# Patient Record
Sex: FEMALE | Race: WHITE | NOT HISPANIC OR LATINO | Employment: OTHER | URBAN - METROPOLITAN AREA
[De-identification: names, ages, dates, MRNs, and addresses within clinical notes are randomized per-mention and may not be internally consistent; named-entity substitution may affect disease eponyms.]

---

## 2023-03-21 ENCOUNTER — TELEPHONE (OUTPATIENT)
Dept: PRIMARY CARE | Facility: CLINIC | Age: 73
End: 2023-03-21

## 2023-03-21 DIAGNOSIS — E78.5 HYPERLIPIDEMIA, UNSPECIFIED HYPERLIPIDEMIA TYPE: Primary | ICD-10-CM

## 2023-03-23 ENCOUNTER — LAB (OUTPATIENT)
Dept: LAB | Facility: LAB | Age: 73
End: 2023-03-23
Payer: MEDICARE

## 2023-03-23 DIAGNOSIS — E78.5 HYPERLIPIDEMIA, UNSPECIFIED HYPERLIPIDEMIA TYPE: ICD-10-CM

## 2023-03-23 LAB
ALANINE AMINOTRANSFERASE (SGPT) (U/L) IN SER/PLAS: 28 U/L (ref 7–45)
ALBUMIN (G/DL) IN SER/PLAS: 4.4 G/DL (ref 3.4–5)
ALKALINE PHOSPHATASE (U/L) IN SER/PLAS: 98 U/L (ref 33–136)
ANION GAP IN SER/PLAS: 14 MMOL/L (ref 10–20)
ASPARTATE AMINOTRANSFERASE (SGOT) (U/L) IN SER/PLAS: 23 U/L (ref 9–39)
BILIRUBIN TOTAL (MG/DL) IN SER/PLAS: 0.6 MG/DL (ref 0–1.2)
CALCIUM (MG/DL) IN SER/PLAS: 10.4 MG/DL (ref 8.6–10.3)
CARBON DIOXIDE, TOTAL (MMOL/L) IN SER/PLAS: 26 MMOL/L (ref 21–32)
CHLORIDE (MMOL/L) IN SER/PLAS: 104 MMOL/L (ref 98–107)
CHOLESTEROL (MG/DL) IN SER/PLAS: 162 MG/DL (ref 0–199)
CHOLESTEROL IN HDL (MG/DL) IN SER/PLAS: 58.5 MG/DL
CHOLESTEROL/HDL RATIO: 2.8
CREATININE (MG/DL) IN SER/PLAS: 0.84 MG/DL (ref 0.5–1.05)
GFR FEMALE: 74 ML/MIN/1.73M2
GLUCOSE (MG/DL) IN SER/PLAS: 87 MG/DL (ref 74–99)
LDL: 89 MG/DL (ref 0–99)
POTASSIUM (MMOL/L) IN SER/PLAS: 4.4 MMOL/L (ref 3.5–5.3)
PROTEIN TOTAL: 6.9 G/DL (ref 6.4–8.2)
SODIUM (MMOL/L) IN SER/PLAS: 140 MMOL/L (ref 136–145)
TRIGLYCERIDE (MG/DL) IN SER/PLAS: 72 MG/DL (ref 0–149)
UREA NITROGEN (MG/DL) IN SER/PLAS: 19 MG/DL (ref 6–23)
VLDL: 14 MG/DL (ref 0–40)

## 2023-03-23 PROCEDURE — 80061 LIPID PANEL: CPT

## 2023-03-23 PROCEDURE — 80053 COMPREHEN METABOLIC PANEL: CPT

## 2023-03-23 PROCEDURE — 36415 COLL VENOUS BLD VENIPUNCTURE: CPT

## 2023-03-29 NOTE — RESULT ENCOUNTER NOTE
The issue is that she can't get the name brand the pharmacies only have the generic is she in the safe zone being off the medication until everything gets worked out

## 2023-04-26 DIAGNOSIS — N39.41 URGE INCONTINENCE: Primary | ICD-10-CM

## 2023-04-28 RX ORDER — MIRABEGRON 50 MG/1
TABLET, FILM COATED, EXTENDED RELEASE ORAL
Qty: 90 TABLET | Refills: 0 | Status: SHIPPED | OUTPATIENT
Start: 2023-04-28 | End: 2023-06-23 | Stop reason: SDUPTHER

## 2023-06-23 ENCOUNTER — OFFICE VISIT (OUTPATIENT)
Dept: PRIMARY CARE | Facility: CLINIC | Age: 73
End: 2023-06-23
Payer: MEDICARE

## 2023-06-23 DIAGNOSIS — Z78.0 MENOPAUSE: ICD-10-CM

## 2023-06-23 DIAGNOSIS — E88.810 DYSMETABOLIC SYNDROME: ICD-10-CM

## 2023-06-23 DIAGNOSIS — R03.0 ELEVATED BP WITHOUT DIAGNOSIS OF HYPERTENSION: ICD-10-CM

## 2023-06-23 DIAGNOSIS — R92.30 DENSE BREAST TISSUE ON MAMMOGRAM: ICD-10-CM

## 2023-06-23 DIAGNOSIS — Z12.31 ENCOUNTER FOR SCREENING MAMMOGRAM FOR MALIGNANT NEOPLASM OF BREAST: ICD-10-CM

## 2023-06-23 DIAGNOSIS — E78.5 HYPERLIPIDEMIA, UNSPECIFIED HYPERLIPIDEMIA TYPE: ICD-10-CM

## 2023-06-23 DIAGNOSIS — R05.3 CHRONIC COUGH: ICD-10-CM

## 2023-06-23 DIAGNOSIS — Z00.00 ENCOUNTER FOR ANNUAL WELLNESS EXAM IN MEDICARE PATIENT: Primary | ICD-10-CM

## 2023-06-23 PROBLEM — M17.9 OSTEOARTHRITIS OF KNEE: Status: ACTIVE | Noted: 2023-06-23

## 2023-06-23 PROBLEM — N39.41 URGE INCONTINENCE: Status: ACTIVE | Noted: 2023-06-23

## 2023-06-23 PROBLEM — E55.9 VITAMIN D DEFICIENCY: Status: ACTIVE | Noted: 2023-06-23

## 2023-06-23 PROBLEM — R55 SYNCOPE: Status: ACTIVE | Noted: 2023-06-23

## 2023-06-23 PROBLEM — B02.29 POST HERPETIC NEURALGIA: Status: ACTIVE | Noted: 2023-06-23

## 2023-06-23 PROCEDURE — 1159F MED LIST DOCD IN RCRD: CPT | Performed by: SPECIALIST

## 2023-06-23 PROCEDURE — 1036F TOBACCO NON-USER: CPT | Performed by: SPECIALIST

## 2023-06-23 PROCEDURE — 1160F RVW MEDS BY RX/DR IN RCRD: CPT | Performed by: SPECIALIST

## 2023-06-23 PROCEDURE — G0439 PPPS, SUBSEQ VISIT: HCPCS | Performed by: SPECIALIST

## 2023-06-23 PROCEDURE — 1170F FXNL STATUS ASSESSED: CPT | Performed by: SPECIALIST

## 2023-06-23 PROCEDURE — 3008F BODY MASS INDEX DOCD: CPT | Performed by: SPECIALIST

## 2023-06-23 RX ORDER — FLUTICASONE PROPIONATE 50 MCG
2 SPRAY, SUSPENSION (ML) NASAL DAILY
COMMUNITY
Start: 2019-10-03

## 2023-06-23 RX ORDER — POTASSIUM CHLORIDE 750 MG/1
10 TABLET, EXTENDED RELEASE ORAL 2 TIMES DAILY
COMMUNITY
End: 2024-02-20 | Stop reason: SDUPTHER

## 2023-06-23 RX ORDER — METHYLPREDNISOLONE 4 MG
1 TABLET, DOSE PACK ORAL DAILY
COMMUNITY

## 2023-06-23 RX ORDER — ACETAMINOPHEN 500 MG
50 TABLET ORAL DAILY
COMMUNITY
Start: 2018-09-11

## 2023-06-23 RX ORDER — PROGESTERONE 100 MG/1
100 CAPSULE ORAL DAILY
COMMUNITY

## 2023-06-23 RX ORDER — MAGNESIUM 200 MG
1 TABLET ORAL DAILY
COMMUNITY
Start: 2022-10-09

## 2023-06-23 RX ORDER — FAMOTIDINE, CALCIUM CARBONATE, AND MAGNESIUM HYDROXIDE 10; 800; 165 MG/1; MG/1; MG/1
1 TABLET, CHEWABLE ORAL NIGHTLY
COMMUNITY

## 2023-06-23 RX ORDER — VERAPAMIL HYDROCHLORIDE 180 MG/1
1 TABLET, FILM COATED, EXTENDED RELEASE ORAL 2 TIMES DAILY
COMMUNITY
Start: 2006-09-21 | End: 2023-12-29

## 2023-06-23 RX ORDER — MINERAL OIL
1 ENEMA (ML) RECTAL DAILY
COMMUNITY

## 2023-06-23 RX ORDER — ROSUVASTATIN CALCIUM 5 MG/1
5 TABLET, COATED ORAL
COMMUNITY
Start: 2019-10-03

## 2023-06-23 RX ORDER — DOCUSATE SODIUM 100 MG/1
100 CAPSULE, LIQUID FILLED ORAL DAILY
COMMUNITY

## 2023-06-23 RX ORDER — MIRABEGRON 50 MG/1
1 TABLET, EXTENDED RELEASE ORAL DAILY
COMMUNITY
Start: 2018-09-20 | End: 2023-07-29

## 2023-06-23 ASSESSMENT — ACTIVITIES OF DAILY LIVING (ADL)
BATHING: INDEPENDENT
DRESSING: INDEPENDENT
TAKING_MEDICATION: INDEPENDENT
DOING_HOUSEWORK: INDEPENDENT
GROCERY_SHOPPING: INDEPENDENT
MANAGING_FINANCES: INDEPENDENT

## 2023-06-23 ASSESSMENT — PATIENT HEALTH QUESTIONNAIRE - PHQ9
SUM OF ALL RESPONSES TO PHQ9 QUESTIONS 1 AND 2: 0
2. FEELING DOWN, DEPRESSED OR HOPELESS: NOT AT ALL
1. LITTLE INTEREST OR PLEASURE IN DOING THINGS: NOT AT ALL

## 2023-06-23 NOTE — ASSESSMENT & PLAN NOTE
Dense breast tissue on mammogram, discussed self pay FAST MRI  Will let me know if she wants me to order

## 2023-06-23 NOTE — ASSESSMENT & PLAN NOTE
Labs ordered HBA1C but cancelled since it was not covered  Actively working toward weight loss with Endocrine

## 2023-06-23 NOTE — ASSESSMENT & PLAN NOTE
Brand Crestor not available, was paying out of pocket  Cannot tolerate generics  Ordered fx lipids   Last LDL 89 (3/2023) had been 3 weeks off Crestor

## 2023-06-23 NOTE — ASSESSMENT & PLAN NOTE
-Does not get annual influenza vaccine due to prior allergic reactions  -Declined Covid vaccines  -Prior PCV 2005  -Prior PPSV 23 on 9/2005, declines repeat   -Prior Tdap 7/2016  -Declined Shingrix vaccine  -Water jogging 5 days per week at the [employer]  -Actively working toward weight loss  -DXA normal 4/28/2021, ordered  -Mammogram 6/1/2022, dense breast tissue, discussed self-pay FAST MRI, ordered  -Colonoscopy 8/2019, repeat 5 years

## 2023-06-23 NOTE — PROGRESS NOTES
Subjective   Patient ID: Meghan Rinaldi is a 72 y.o. female who presents for Medicare Annual Wellness Visit Subsequent.    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, diverticulosis, and Covid here today for Annual Wellness Exam    Had migraine x 7 weeks after surgery procedure D&C  Sees Neuro, is on verapamil, for migraine basilar syndrome - had syncopal episode so now drinking gatorade since happened after exercise    Calcium 10.4 on CMP 3/23/2023, will recheck    Eyes have improved, attributes to Balance of Nature supplement    Cardiomegaly since Influenza B, had to retire    Lost 50 pounds but gained 5 back on MPSF diet with endo    Lost 6 friends , has been very hard    Cough when exercising since she had Covid and said  has same cough like a post nasal drip cough    Allergies   Allergen Reactions    Bee Venom Protein (Honey Bee) Anaphylaxis    Amoxicillin Other    Diclofenac Sodium Other     shingles    Doxycycline Unknown and Other    Erythromycin Other     Respitory    Gabapentin Other    Other Other     Mercury (preservative) causes severe diarrhea USED in all GENERIC MEDS. TO HAVE NO GENERIC MEDS    Preservative Other     Contact solution, respiratory, diarrhea    Rosuvastatin Other    Sulfa (Sulfonamide Antibiotics) Other    Thimerosal Other    Topiramate Other     States she becomes manic.    Cephalexin Rash and Other      Current Outpatient Medications   Medication Instructions    BIOFREEZE, MENTHOL, TOP 1 Application, Topical, 2 times daily PRN    biotin/folic ac/vit Bcomp,C/Zn (BIOTIN FORTE ORAL) 1 tablet, oral, Daily, Takes one 5 mg tab daily    calcium carb/vitamin D3/vit K1 (VIACTIV ORAL) 1 tablet, oral, 2 times daily    calcium polycarbophil (FIBER-CAPS, CA POLYCARBOPHIL, ORAL) 1 tablet, oral, Daily    cholecalciferol (VITAMIN D-3) 50 mcg, oral, Daily    docusate sodium  (COLACE) 100 mg, oral, Daily    famotidine-Ca carb-mag hydrox (Pepcid Complete) -165 mg chewable tablet 1 tablet, oral, Nightly    fexofenadine (Allegra Allergy) 180 mg tablet 1 tablet, oral, Daily    fluticasone (Flonase Allergy Relief) 50 mcg/actuation nasal spray 2 sprays, nasal, Daily    glucosamine sulfate (Glucosamine) 500 mg tablet 1 tablet, oral, Daily, Takes 5 Total Restores daily    magnesium 200 mg tablet 1 tablet, oral, Daily, Takes 250 mg daily    mirabegron (Myrbetriq) 50 mg tablet extended release 24 hr 24 hr tablet 1 tablet, oral, Daily    multivitamin capsule 1 capsule, oral, 2 times daily, Takes BALANCE OF NATURE    potassium chloride CR 10 mEq ER tablet 10 mEq, oral, 2 times daily    progesterone (PROMETRIUM) 100 mg, oral, Daily, Takes unknown dose 2 weeks every other month (Sees gyne)    rosuvastatin (CRESTOR) 5 mg, oral, Weekly    verapamil SR (Calan-SR) 180 mg ER tablet 1 tablet, oral, 2 times daily        Review of Systems  Constitutional  No fatigue, no fevers, no chills, no unintentional weight loss,   HEENT:  Positive Migraines behind both eyes 3 days stays in dark, No headaches, no dizziness, no double vision, no blurred vision, no hearing loss  Cardiovascular:  No chest pain, no palpitations, no shortness of breath with exertion (one flight of stairs)  Respiratory:  Positive cough,(since Covid), no hemoptysis, no wheezing, No shortness of breath at rest  GI:  No dysphagia, no odynophagia, no reflux, no abdominal pain, no nausea, no vomiting, no changes in bowel habits, moves bowels regularly since Covid, no bright red blood per rectum, no melena  :  No urinary frequency, no dysuria, positive urine incontinence  MSK:  No falls, knee joint pain, no joint swelling  Neuro:  No tremors, no extremity weakness, no changes in sensation  Breasts:  No abnormalities on self breast exam no nipple discharge no skin changes      Physical Exam  /70   Pulse 65   Resp 17   Ht 1.575 m (5'  "2\")   Wt 101 kg (223 lb 6.4 oz)   SpO2 97%   BMI 40.86 kg/m²   General:    Well-appearing  F in no acute distress, well nourished, well hydrated  Head:  Normocephalic, atraumatic  Skin:          Warm dry,   Eyes:  Anicteric sclera, pupils equal,   Ears:        TMs intact  Oral:      Not examined due to pandemic  Neck:   Supple, no cervical/supraclavicular adenopathy, no thyromegaly or nodules appreciated on exam  Cor:      Regular rate, normal S1, S2, no murmurs appreciated, no S3, no S4   Lungs:   Clear to auscultation b/l, no wheezes, no rhonchi, no crackles, no accessory respiratory muscle use  Abd:          Soft, nontender, no guarding, no rebound, no hepatosplenomegaly appreciated   Ext:            No lower extremity edema, no palpable cords  Pulses:      Pedal pulses intact  Neuro:   CN2-12 grossly intact     Assessment/Plan   Problem List Items Addressed This Visit       Encounter for annual wellness exam in Medicare patient - Primary     -Does not get annual influenza vaccine due to prior allergic reactions  -Declined Covid vaccines  -Prior PCV 2005  -Prior PPSV 23 on 9/2005, declines repeat   -Prior Tdap 7/2016  -Declined Shingrix vaccine  [facility]  -Actively working toward weight loss  -DXA normal 4/28/2021, ordered  -Mammogram 6/1/2022, dense breast tissue, discussed self-pay FAST MRI, ordered  -Colonoscopy 8/2019, repeat 5 years           Relevant Orders    Lipid Panel    CBC    Comprehensive Metabolic Panel    Encounter for screening mammogram for malignant neoplasm of breast     Mammogram 6/1/2022, repeat 1 year  Dense breast tissue noted on mammogram         Relevant Orders    BI mammo bilateral screening tomosynthesis    Dense breast tissue on mammogram     Dense breast tissue on mammogram, discussed self pay FAST MRI  Will let me know if she wants me to order         Menopause     Bone density test ordered (last normal 4/28/2021)           Relevant Orders    XR DEXA " bone density    Chronic cough    Relevant Orders    XR chest 2 views    CBC    Dysmetabolic syndrome     Labs ordered HBA1C but cancelled since it was not covered  Actively working toward weight loss with Endocrine           Hyperlipidemia     Brand Crestor not available, was paying out of pocket  Cannot tolerate generics  Ordered fx lipids   Last LDL 89 (3/2023) had been 3 weeks off Crestor          Relevant Orders    Lipid Panel    Comprehensive Metabolic Panel    Body mass index (BMI) of 40.0 to 44.9 in adult (CMS/[facility])     Successful weight loss on MPSF diet with endocrine  Weight 12/2022 was 230#, down to 223#         Elevated BP without diagnosis of hypertension     Blood pressure above goal today at 140/70, but has been ingesting sodium to help prevent syncope so reduce a bit (drinks Gatorade before and after exercise to help prevent syncope).  Plans home blood pressure monitoring and will help me know if BP consistently above 140/90.  On Verapamil for migraine basilar syndrome               Maki Michel, DO

## 2023-06-29 VITALS
BODY MASS INDEX: 41.11 KG/M2 | DIASTOLIC BLOOD PRESSURE: 70 MMHG | SYSTOLIC BLOOD PRESSURE: 140 MMHG | OXYGEN SATURATION: 97 % | WEIGHT: 223.4 LBS | RESPIRATION RATE: 17 BRPM | HEIGHT: 62 IN | HEART RATE: 65 BPM

## 2023-06-29 PROBLEM — R03.0 ELEVATED BP WITHOUT DIAGNOSIS OF HYPERTENSION: Status: ACTIVE | Noted: 2023-06-29

## 2023-06-29 NOTE — ASSESSMENT & PLAN NOTE
Blood pressure above goal today at 140/70, but has been ingesting sodium to help prevent syncope so reduce a bit (drinks Gatorade before and after exercise to help prevent syncope).  Plans home blood pressure monitoring and will help me know if BP consistently above 140/90.  On Verapamil for migraine basilar syndrome

## 2023-07-03 ENCOUNTER — LAB (OUTPATIENT)
Dept: LAB | Facility: LAB | Age: 73
End: 2023-07-03
Payer: MEDICARE

## 2023-07-03 DIAGNOSIS — Z00.00 ENCOUNTER FOR ANNUAL WELLNESS EXAM IN MEDICARE PATIENT: ICD-10-CM

## 2023-07-03 DIAGNOSIS — R05.3 CHRONIC COUGH: ICD-10-CM

## 2023-07-03 DIAGNOSIS — E78.5 HYPERLIPIDEMIA, UNSPECIFIED HYPERLIPIDEMIA TYPE: ICD-10-CM

## 2023-07-03 LAB
ERYTHROCYTE DISTRIBUTION WIDTH (RATIO) BY AUTOMATED COUNT: 12.8 % (ref 11.5–14.5)
ERYTHROCYTE MEAN CORPUSCULAR HEMOGLOBIN CONCENTRATION (G/DL) BY AUTOMATED: 31.7 G/DL (ref 32–36)
ERYTHROCYTE MEAN CORPUSCULAR VOLUME (FL) BY AUTOMATED COUNT: 101 FL (ref 80–100)
ERYTHROCYTES (10*6/UL) IN BLOOD BY AUTOMATED COUNT: 4.46 X10E12/L (ref 4–5.2)
HEMATOCRIT (%) IN BLOOD BY AUTOMATED COUNT: 45.1 % (ref 36–46)
HEMOGLOBIN (G/DL) IN BLOOD: 14.3 G/DL (ref 12–16)
LEUKOCYTES (10*3/UL) IN BLOOD BY AUTOMATED COUNT: 8.4 X10E9/L (ref 4.4–11.3)
NRBC (PER 100 WBCS) BY AUTOMATED COUNT: 0 /100 WBC (ref 0–0)
PLATELETS (10*3/UL) IN BLOOD AUTOMATED COUNT: 243 X10E9/L (ref 150–450)

## 2023-07-03 PROCEDURE — 85027 COMPLETE CBC AUTOMATED: CPT

## 2023-07-03 PROCEDURE — 80061 LIPID PANEL: CPT

## 2023-07-03 PROCEDURE — 80053 COMPREHEN METABOLIC PANEL: CPT

## 2023-07-03 PROCEDURE — 36415 COLL VENOUS BLD VENIPUNCTURE: CPT

## 2023-07-04 LAB
ALANINE AMINOTRANSFERASE (SGPT) (U/L) IN SER/PLAS: 20 U/L (ref 7–45)
ALBUMIN (G/DL) IN SER/PLAS: 4.2 G/DL (ref 3.4–5)
ALKALINE PHOSPHATASE (U/L) IN SER/PLAS: 97 U/L (ref 33–136)
ANION GAP IN SER/PLAS: 12 MMOL/L (ref 10–20)
ASPARTATE AMINOTRANSFERASE (SGOT) (U/L) IN SER/PLAS: 19 U/L (ref 9–39)
BILIRUBIN TOTAL (MG/DL) IN SER/PLAS: 0.5 MG/DL (ref 0–1.2)
CALCIUM (MG/DL) IN SER/PLAS: 10.1 MG/DL (ref 8.6–10.6)
CARBON DIOXIDE, TOTAL (MMOL/L) IN SER/PLAS: 29 MMOL/L (ref 21–32)
CHLORIDE (MMOL/L) IN SER/PLAS: 107 MMOL/L (ref 98–107)
CHOLESTEROL (MG/DL) IN SER/PLAS: 159 MG/DL (ref 0–199)
CHOLESTEROL IN HDL (MG/DL) IN SER/PLAS: 56.3 MG/DL
CHOLESTEROL/HDL RATIO: 2.8
CREATININE (MG/DL) IN SER/PLAS: 0.8 MG/DL (ref 0.5–1.05)
GFR FEMALE: 78 ML/MIN/1.73M2
GLUCOSE (MG/DL) IN SER/PLAS: 88 MG/DL (ref 74–99)
LDL: 89 MG/DL (ref 0–99)
POTASSIUM (MMOL/L) IN SER/PLAS: 4.6 MMOL/L (ref 3.5–5.3)
PROTEIN TOTAL: 6.6 G/DL (ref 6.4–8.2)
SODIUM (MMOL/L) IN SER/PLAS: 143 MMOL/L (ref 136–145)
TRIGLYCERIDE (MG/DL) IN SER/PLAS: 67 MG/DL (ref 0–149)
UREA NITROGEN (MG/DL) IN SER/PLAS: 18 MG/DL (ref 6–23)
VLDL: 13 MG/DL (ref 0–40)

## 2023-07-28 DIAGNOSIS — N39.41 URGE INCONTINENCE: Primary | ICD-10-CM

## 2023-07-29 RX ORDER — MIRABEGRON 50 MG/1
50 TABLET, FILM COATED, EXTENDED RELEASE ORAL DAILY
Qty: 90 TABLET | Refills: 3 | Status: SHIPPED | OUTPATIENT
Start: 2023-07-29

## 2023-08-22 PROBLEM — D22.9 MULTIPLE NEVI: Status: ACTIVE | Noted: 2023-08-22

## 2023-08-22 PROBLEM — M25.569 PAIN IN UNSPECIFIED KNEE: Status: ACTIVE | Noted: 2023-08-22

## 2023-08-22 PROBLEM — E66.01 MORBID (SEVERE) OBESITY DUE TO EXCESS CALORIES (MULTI): Status: ACTIVE | Noted: 2023-08-22

## 2023-08-22 PROBLEM — R82.90 ABNORMAL URINALYSIS: Status: ACTIVE | Noted: 2023-08-22

## 2023-08-22 PROBLEM — L60.3 DYSTROPHIC NAIL: Status: ACTIVE | Noted: 2023-08-22

## 2023-08-22 PROBLEM — Z86.010 HISTORY OF COLON POLYPS: Status: ACTIVE | Noted: 2023-08-22

## 2023-08-22 PROBLEM — M54.59 MECHANICAL LOW BACK PAIN: Status: ACTIVE | Noted: 2023-08-22

## 2023-08-22 PROBLEM — Z86.0100 HISTORY OF COLON POLYPS: Status: ACTIVE | Noted: 2023-08-22

## 2023-08-22 PROBLEM — R60.9 EDEMA: Status: ACTIVE | Noted: 2023-08-22

## 2023-08-22 RX ORDER — ACETAMINOPHEN 325 MG/1
1 TABLET ORAL EVERY 6 HOURS PRN
COMMUNITY
Start: 2021-04-05

## 2023-08-22 RX ORDER — MIRABEGRON 25 MG/1
25 TABLET, FILM COATED, EXTENDED RELEASE ORAL
COMMUNITY
Start: 2017-12-07

## 2023-08-22 RX ORDER — DICLOFENAC SODIUM 16.05 MG/ML
2 SOLUTION TOPICAL 2 TIMES DAILY
COMMUNITY
Start: 2017-12-05

## 2023-08-22 RX ORDER — BIOTIN 1 MG
1 TABLET ORAL
COMMUNITY

## 2023-08-22 RX ORDER — CELECOXIB 200 MG/1
200 CAPSULE ORAL 2 TIMES DAILY
COMMUNITY

## 2023-08-22 RX ORDER — MEDROXYPROGESTERONE ACETATE 10 MG/1
1 TABLET ORAL DAILY
COMMUNITY
Start: 2022-04-21

## 2023-08-22 RX ORDER — ELECTROLYTES/DEXTROSE
5 SOLUTION, ORAL ORAL EVERY MORNING
COMMUNITY
Start: 2018-09-11

## 2023-08-22 RX ORDER — TRAZODONE HYDROCHLORIDE 50 MG/1
50 TABLET ORAL NIGHTLY
COMMUNITY

## 2023-10-10 ENCOUNTER — APPOINTMENT (OUTPATIENT)
Dept: CARE COORDINATION | Facility: CLINIC | Age: 73
End: 2023-10-10
Payer: MEDICARE

## 2023-10-23 ENCOUNTER — TELEPHONE (OUTPATIENT)
Dept: NUTRITION | Facility: CLINIC | Age: 73
End: 2023-10-23
Payer: MEDICARE

## 2023-10-23 NOTE — PROGRESS NOTES
Called patient to discuss next requested MNT appointment. Unable to leave message due to a full voicemail box. Will make a second attempt at another time.

## 2023-11-14 ENCOUNTER — OFFICE VISIT (OUTPATIENT)
Dept: ENDOCRINOLOGY | Facility: CLINIC | Age: 73
End: 2023-11-14
Payer: MEDICARE

## 2023-11-14 VITALS — WEIGHT: 226 LBS | DIASTOLIC BLOOD PRESSURE: 78 MMHG | SYSTOLIC BLOOD PRESSURE: 124 MMHG | BODY MASS INDEX: 41.34 KG/M2

## 2023-11-14 DIAGNOSIS — E88.810 DYSMETABOLIC SYNDROME: Primary | ICD-10-CM

## 2023-11-14 DIAGNOSIS — M17.9 OSTEOARTHRITIS OF KNEE, UNSPECIFIED LATERALITY, UNSPECIFIED OSTEOARTHRITIS TYPE: ICD-10-CM

## 2023-11-14 DIAGNOSIS — E78.5 HYPERLIPIDEMIA, UNSPECIFIED HYPERLIPIDEMIA TYPE: ICD-10-CM

## 2023-11-14 PROCEDURE — 1160F RVW MEDS BY RX/DR IN RCRD: CPT | Performed by: INTERNAL MEDICINE

## 2023-11-14 PROCEDURE — 99214 OFFICE O/P EST MOD 30 MIN: CPT | Performed by: INTERNAL MEDICINE

## 2023-11-14 PROCEDURE — 3008F BODY MASS INDEX DOCD: CPT | Performed by: INTERNAL MEDICINE

## 2023-11-14 PROCEDURE — 1036F TOBACCO NON-USER: CPT | Performed by: INTERNAL MEDICINE

## 2023-11-14 PROCEDURE — 1159F MED LIST DOCD IN RCRD: CPT | Performed by: INTERNAL MEDICINE

## 2023-11-14 NOTE — PROGRESS NOTES
Patient ID: Meghan Rinaldi is a 73 y.o. female who presents for Follow-up.  HPI  The patient comes in for follow up.    She has been on and off PSMF for management of insulin resistance with postprandial fatigue carbohydrate craving joint pain hyperlipidemia edema and GERD.    She has been off track with a lot of stressful events but is looking to get back on track.    She states when she takes progesterone 14 days every other month she has a significant amount of carbohydrate craving.    She is going to find out whether she is staying on it.    She has been walking daily.    Physically she has no other complaints.    ROS  Comprehensive review of systems is negative.    Objective   Physical Exam  Visit Vitals  /78      Vitals:    11/14/23 1043   Weight: 103 kg (226 lb)      Body mass index is 41.34 kg/m².      Weight 226 up 1 pound still down 42    Eyes normal  ENT normal. No adenopathy  Thyroid palpable and normal. No nodules  Chest clear to auscultation  Heart sounds are normal  Abdomen nontender. Bowel sounds normal. No organomegaly  Feet are okay    Assessment/Plan     1.  Insulin resistance  2.  Hyperlipidemia  3.  Joint pain  4.  Edema  5.  GERD    She is going to work to get back on track.  We discussed strategies for success. Planning ahead, taking things a week at a time and planning the week ahead of time.    We discussed strategies with regards to the holidays.    She will continue exercising.    We will hold on blood work today.    Follow up with the nutritionist in 1 month, me in 2 months, sooner as needed.

## 2023-12-12 ENCOUNTER — NUTRITION (OUTPATIENT)
Dept: ENDOCRINOLOGY | Facility: CLINIC | Age: 73
End: 2023-12-12
Payer: MEDICARE

## 2023-12-12 NOTE — PROGRESS NOTES
Patient Discussion/Summary:   Met with pt on 23 for MNT discussing the \[facility]\""F diet. Pt takes progesterone 14 days every other month and reports an increase in CHO cravings and mood changes. Encouraged increasing protein or non-starchy vegetable intake during this time.     Protein sparing modified fasting education handout was previously provided to patient and reviewed along with daily protein and fluid recommendations. Understands need to take supplements, measure foods and test ketones going forward. Encouraged to follow diet as outlined for optimal results. Discussed meal examples and provided encouragement. Pt engaged and asked multiple questions during session.     Calculations:    IBW: 110lb/50kg  Protein need calculated (1.5g/kg IBW) = 75g  Protein intake recommended: 77g = 11oz      Daily Meal Plan:   Morning:    3oz protein          Afternoon:  4oz protein + 1 vegetable from the list   Eveninoz protein + 1 vegetable from the list   Fluids:        64-96oz per day     Recommended supplements:  Potassium as directed with breakfast and dinner (prescription)  Fiber capsules 1-2 times daily or sugar-free fiber powder (as tolerated)   Colace (stool softener) as needed, up to 4 capsules/day  Calcium: 500-600 mg, pill form twice daily with breakfast or lunch AND dinner  Multivitamin tablet or capsule per day that is low in iron (i.e. Centrum Silver)  Magnesium: 200-250 mg, pill form with dinner or at bedtime  Bouillon cube one to two times per day as needed, if feeling dizzy   Beginning with the fourth morning of this diet, test urine every morning using Ketostix for ketone (fat) breakdown.

## 2023-12-29 DIAGNOSIS — G43.909 MIGRAINE WITHOUT STATUS MIGRAINOSUS, NOT INTRACTABLE, UNSPECIFIED MIGRAINE TYPE: Primary | ICD-10-CM

## 2023-12-29 RX ORDER — VERAPAMIL HYDROCHLORIDE 180 MG/1
180 TABLET, FILM COATED, EXTENDED RELEASE ORAL 2 TIMES DAILY
Qty: 180 TABLET | Refills: 0 | Status: SHIPPED | OUTPATIENT
Start: 2023-12-29 | End: 2024-03-26

## 2024-01-15 ENCOUNTER — APPOINTMENT (OUTPATIENT)
Dept: ENDOCRINOLOGY | Facility: CLINIC | Age: 74
End: 2024-01-15
Payer: MEDICARE

## 2024-01-19 ENCOUNTER — OFFICE VISIT (OUTPATIENT)
Dept: OBSTETRICS AND GYNECOLOGY | Facility: CLINIC | Age: 74
End: 2024-01-19
Payer: MEDICARE

## 2024-01-19 VITALS — SYSTOLIC BLOOD PRESSURE: 134 MMHG | DIASTOLIC BLOOD PRESSURE: 70 MMHG | BODY MASS INDEX: 42.07 KG/M2 | WEIGHT: 230 LBS

## 2024-01-19 DIAGNOSIS — N95.0 POSTMENOPAUSAL BLEEDING: Primary | ICD-10-CM

## 2024-01-19 PROCEDURE — 1160F RVW MEDS BY RX/DR IN RCRD: CPT | Performed by: OBSTETRICS & GYNECOLOGY

## 2024-01-19 PROCEDURE — 1159F MED LIST DOCD IN RCRD: CPT | Performed by: OBSTETRICS & GYNECOLOGY

## 2024-01-19 PROCEDURE — 99213 OFFICE O/P EST LOW 20 MIN: CPT | Performed by: OBSTETRICS & GYNECOLOGY

## 2024-01-19 PROCEDURE — 1036F TOBACCO NON-USER: CPT | Performed by: OBSTETRICS & GYNECOLOGY

## 2024-01-19 PROCEDURE — 3008F BODY MASS INDEX DOCD: CPT | Performed by: OBSTETRICS & GYNECOLOGY

## 2024-01-19 NOTE — PROGRESS NOTES
"    ASSESSMENT/PLAN  H/o postmenopausal bleeding with proliferative endometrium on D&C in past.  Okay to discontinue cyclic progesterone.  If any additional bleeding, pt to notify me.       SUBJECTIVE    HPI    71 yo for followup of postmenopausal bleeding.   Last visit 7/2023.    Pt taking medroxyprogesterone 10 mg every other month for 14 days.   She notes just pink when wiping after finishing the 14 days in last 6 months.   Wants to stop the medroxyprogesterone because it \"makes her feel mean\". Also working with Dr. Leach on protein based diet for weight loss and she feels like the medroxyprogesterone is working against her.   GYN hx as follows:  4/2022 Hysteroscopy dilation and curettage for postmenopausal bleeding. Path disordered proliferative. After d/w GYN/ONC, recommendation made for cyclic progesterone.   1st dose of 14 days of medroxyprogesterone was June 2022. Pt taking every other month. Initially pt had moderate bleeding following medroxyprogesterone in July, Sept and December 2022. When seen in 12/2022 Endometrial biopsy repeated and was inactive endometrium. Pt was instructed to continue on every other month 14 days of medroxyprogesterone as of last visit 7/2023 because she was still having light bleeding after finishing the 14 days.        OBJECTIVE    /70   Wt 104 kg (230 lb)   BMI 42.07 kg/m²     Physical Exam     Constitutional: Alert and in no acute distress. Well developed, well nourished   Abdomen: obese, soft nontender; no abdominal mass palpated, no organomegaly and no hernias   Genitourinary: external genitalia: normal, no inguinal lymphadenopathy, Bartholin's urethral and Downey's glands: normal, urethra: normal, bladder: normal on palpation and perianal area: normal   Vagina: normal.   Cervix: Normal appearing without lesions.  Uterus: Normal, mobile, nontender.  Right Adnexa/parametria: Normal.   Left Adnexa/parametria: Normal.   Psychiatric: alert and oriented x 3., affect " normal to patient baseline and mood: appropriate       Reba Mark MD

## 2024-02-20 ENCOUNTER — APPOINTMENT (OUTPATIENT)
Dept: ENDOCRINOLOGY | Facility: CLINIC | Age: 74
End: 2024-02-20
Payer: MEDICARE

## 2024-02-20 DIAGNOSIS — E88.810 DYSMETABOLIC SYNDROME: Primary | ICD-10-CM

## 2024-02-20 RX ORDER — POTASSIUM CHLORIDE 750 MG/1
10 TABLET, EXTENDED RELEASE ORAL 2 TIMES DAILY
Qty: 180 TABLET | Refills: 3 | Status: SHIPPED | OUTPATIENT
Start: 2024-02-20

## 2024-02-28 ENCOUNTER — OFFICE VISIT (OUTPATIENT)
Dept: PRIMARY CARE | Facility: CLINIC | Age: 74
End: 2024-02-28
Payer: MEDICARE

## 2024-02-28 VITALS — DIASTOLIC BLOOD PRESSURE: 73 MMHG | SYSTOLIC BLOOD PRESSURE: 123 MMHG

## 2024-02-28 DIAGNOSIS — R03.0 ELEVATED BP WITHOUT DIAGNOSIS OF HYPERTENSION: ICD-10-CM

## 2024-02-28 DIAGNOSIS — H91.91 HEARING LOSS OF RIGHT EAR, UNSPECIFIED HEARING LOSS TYPE: ICD-10-CM

## 2024-02-28 DIAGNOSIS — H69.93 DYSFUNCTION OF BOTH EUSTACHIAN TUBES: Primary | ICD-10-CM

## 2024-02-28 PROCEDURE — 1036F TOBACCO NON-USER: CPT | Performed by: INTERNAL MEDICINE

## 2024-02-28 PROCEDURE — 1159F MED LIST DOCD IN RCRD: CPT | Performed by: INTERNAL MEDICINE

## 2024-02-28 PROCEDURE — 3008F BODY MASS INDEX DOCD: CPT | Performed by: INTERNAL MEDICINE

## 2024-02-28 PROCEDURE — 99213 OFFICE O/P EST LOW 20 MIN: CPT | Performed by: INTERNAL MEDICINE

## 2024-02-28 PROCEDURE — 1160F RVW MEDS BY RX/DR IN RCRD: CPT | Performed by: INTERNAL MEDICINE

## 2024-02-28 NOTE — PROGRESS NOTES
Subjective   Patient ID: Meghan Rinaldi is a 73 y.o. female who presents for No chief complaint on file..    HPI Sick visit same day no chest pain no shortness of breath feels fullness in the rightHere she has too much decreased hearing There is no vertigo has had some sinus issues     Flonase dosing she is already on Allegra 180 mg t2-day has not fever there has been some cough is fluctuating chest pain shortness of breath     Review of Systems    Objective   There were no vitals taken for this visit.    Physical Exam  Vital signs noted alert and oriented x 3 NCAT no coryza nares clear discharge swollen turbinates OP benign TM EAC clear on the left on the right EAC slight amount of cerumen there is mild air-fluid level there is some scar tissue between 1 and 2:00 no AC nodes no JVD chest clear to auscultation CV regular rate and rhythm S1-S2 extremities no clubbing cyanosis or edema normal distal pulses  Assessment/Plan     Impression eustachian tube dysfunction other diagnoses hearing loss htn  Plan there does not appear to be any form of infection already taking antihistamine and nasal spray instructed in use okay for analgesic medicine if needed okay for chewing gum or hard lozenge some time awaiting better meds of the weather downturning cold today corresponding with when she had symptoms follow-up with ENT if no better and/or audiology continue with cardiovascular medication, bp ok to day watch salt recheck based on above and for regular visit and with Dr. Michel    Nursing and ENT

## 2024-03-20 ENCOUNTER — OFFICE VISIT (OUTPATIENT)
Dept: ENDOCRINOLOGY | Facility: CLINIC | Age: 74
End: 2024-03-20
Payer: MEDICARE

## 2024-03-20 VITALS — SYSTOLIC BLOOD PRESSURE: 122 MMHG | WEIGHT: 230 LBS | BODY MASS INDEX: 42.07 KG/M2 | DIASTOLIC BLOOD PRESSURE: 74 MMHG

## 2024-03-20 DIAGNOSIS — E78.5 HYPERLIPIDEMIA, UNSPECIFIED HYPERLIPIDEMIA TYPE: ICD-10-CM

## 2024-03-20 DIAGNOSIS — E88.810 DYSMETABOLIC SYNDROME: Primary | ICD-10-CM

## 2024-03-20 DIAGNOSIS — R60.9 EDEMA, UNSPECIFIED TYPE: ICD-10-CM

## 2024-03-20 PROCEDURE — 99214 OFFICE O/P EST MOD 30 MIN: CPT | Performed by: INTERNAL MEDICINE

## 2024-03-20 PROCEDURE — 1036F TOBACCO NON-USER: CPT | Performed by: INTERNAL MEDICINE

## 2024-03-20 PROCEDURE — 1160F RVW MEDS BY RX/DR IN RCRD: CPT | Performed by: INTERNAL MEDICINE

## 2024-03-20 PROCEDURE — 3008F BODY MASS INDEX DOCD: CPT | Performed by: INTERNAL MEDICINE

## 2024-03-20 PROCEDURE — 1159F MED LIST DOCD IN RCRD: CPT | Performed by: INTERNAL MEDICINE

## 2024-03-20 NOTE — PROGRESS NOTES
Patient ID: Meghan Rinaldi is a 73 y.o. female who presents for Follow-up.  HPI  The patient comes in for follow up.    She has been on and off PSMF for management of insulin resistance with postprandial fatigue carbohydrate craving joint pain hyperlipidemia edema and GERD.    She has been off track with a lot of stressful events but has been back on track for the last week.    She had been on progesterone 14 days every other month she has a significant amount of carbohydrate craving.  It was discontinued.    She has been walking daily.    Physically she has no other complaints.    ROS  Comprehensive review of systems is negative.    Objective   Physical Exam  Visit Vitals  /74      Vitals:    03/20/24 1208   Weight: 104 kg (230 lb)      Body mass index is 42.07 kg/m².      Weight 234 pounds still down 38    Eyes normal  ENT normal. No adenopathy  Thyroid palpable and normal. No nodules  Chest clear to auscultation  Heart sounds are normal  Abdomen nontender. Bowel sounds normal. No organomegaly  Feet are okay      Current Outpatient Medications   Medication Sig Dispense Refill    acetaminophen (TylenoL) 325 mg tablet Take 1 tablet (325 mg) by mouth every 6 hours if needed. 1 TO 2 TABLETS      ascorbic acid, vitamin C, 1,000 mg ER tablet Take 1 tablet (1,000 mg) by mouth once daily.      BIOFREEZE, MENTHOL, TOP Apply 1 Application topically 2 times a day as needed.      biotin 1 mg tablet Take 1 tablet (1 mg) by mouth. Monday, Wednesday, Friday      biotin 5 mg capsule Take 1 capsule (5 mg) by mouth once daily in the morning.      biotin/folic ac/vit Bcomp,C/Zn (BIOTIN FORTE ORAL) Take 1 tablet by mouth once daily. Takes one 5 mg tab daily      calcium carb/vitamin D3/vit K1 (VIACTIV ORAL) Take 1 tablet by mouth 2 times a day.      calcium polycarbophil (FIBER-CAPS, CA POLYCARBOPHIL, ORAL) Take 1 tablet by mouth once daily.      celecoxib (CeleBREX) 200 mg capsule Take 1 capsule (200 mg) by mouth twice a day.       cholecalciferol (Vitamin D-3) 50 mcg (2,000 unit) capsule Take 1 capsule (50 mcg) by mouth early in the morning..      diclofenac sodium 1.5 % drops 2 sprays 2 times a day.      docusate sodium (Colace) 100 mg capsule Take 1 capsule (100 mg) by mouth once daily.      ECHINACEA ORAL Take by mouth.      efinaconazole 10 % solution with applicator Apply to affected area.      famotidine-Ca carb-mag hydrox (Pepcid Complete) -165 mg chewable tablet Chew 1 tablet once daily at bedtime.      fexofenadine (Allegra Allergy) 180 mg tablet Take 1 tablet (180 mg) by mouth once daily.      fluticasone (Flonase Allergy Relief) 50 mcg/actuation nasal spray Administer 2 sprays into affected nostril(s) once daily.      glucosamine sulfate (Glucosamine) 500 mg tablet Take 1 tablet by mouth once daily. Takes 5 Total Restores daily      magnesium 200 mg tablet Take 1 tablet (200 mg) by mouth early in the morning.. Takes 250 mg daily      medroxyPROGESTERone (Provera) 10 mg tablet Take 1 tablet (10 mg) by mouth once daily.      mirabegron (Myrbetriq) 25 mg tablet extended release 24 hr 24 hr tablet 1 tablet (25 mg).      multivitamin capsule Take 1 capsule by mouth twice a day. Takes BALANCE OF NATURE      Myrbetriq 50 mg tablet extended release 24 hr 24 hr tablet Take 1 tablet (50 mg) by mouth once daily. 90 tablet 3    potassium chloride CR 10 mEq ER tablet Take 1 tablet (10 mEq) by mouth 2 times a day. 180 tablet 3    progesterone (Prometrium) 100 mg capsule Take 1 capsule (100 mg) by mouth once daily. Takes unknown dose 2 weeks every other month (Sees gyne)      rosuvastatin (Crestor) 5 mg tablet Take 1 tablet (5 mg) by mouth 1 (one) time per week.      traZODone (Desyrel) 50 mg tablet Take 1 tablet (50 mg) by mouth once daily at bedtime.      verapamil SR (Calan-SR) 180 mg ER tablet TAKE ONE TABLET BY MOUTH TWO TIMES A  tablet 0    ZINC ORAL Take 1 tablet by mouth once daily.       No current facility-administered  medications for this visit.       Assessment/Plan     1.  Insulin resistance  2.  Hyperlipidemia  3.  Edema  4.  Joint pain  5.  GERD    We discussed the impact of stress.    She is going to work to stay on track.  We discussed strategies for success. Planning ahead, taking things a week at a time and planning the week ahead of time.    She will work on exercise.    She will maintain her current regimen.    We will hold on blood work today.    Follow up with the nutritionist in 1 month, me in 2 months, sooner as needed.

## 2024-03-25 DIAGNOSIS — G43.909 MIGRAINE WITHOUT STATUS MIGRAINOSUS, NOT INTRACTABLE, UNSPECIFIED MIGRAINE TYPE: ICD-10-CM

## 2024-03-26 RX ORDER — VERAPAMIL HYDROCHLORIDE 180 MG/1
180 TABLET, FILM COATED, EXTENDED RELEASE ORAL 2 TIMES DAILY
Qty: 180 TABLET | Refills: 0 | Status: SHIPPED | OUTPATIENT
Start: 2024-03-26

## 2024-04-18 ENCOUNTER — NUTRITION (OUTPATIENT)
Dept: ENDOCRINOLOGY | Facility: CLINIC | Age: 74
End: 2024-04-18
Payer: MEDICARE

## 2024-04-18 VITALS — BODY MASS INDEX: 42.62 KG/M2 | WEIGHT: 233 LBS

## 2024-04-18 NOTE — PROGRESS NOTES
Patient Discussion/Summary:   Met with pt on 24 for MNT discussing the [facility] diet. [facility] Has [facility]ed as of today. Reports daily weigh ins at home help her stay on track. Will start testing ketones, taking supplements and getting back to basics with measuring intake.     Protein sparing modified fasting education handout was previously provided to patient and reviewed along with daily protein and fluid recommendations. Understands need to take supplements, measure foods and test ketones going forward. Encouraged to follow diet as outlined for optimal results. Discussed meal examples and provided encouragement. Pt engaged and asked multiple questions during session.     Wt Readings from Last 4 Encounters:   24 106 kg (233 lb)   24 104 kg (230 lb)   24 104 kg (230 lb)   23 103 kg (226 lb)     Calculations:    IBW: 110lb/50kg  Protein need calculated (1.5g/kg IBW) = 75g  Protein intake recommended: 77g = 11oz      Daily Meal Plan:   Morning:    3oz protein          Afternoon:  4oz protein + 1 vegetable from the list   Eveninoz protein + 1 vegetable from the list   Fluids:        64-96oz per day     Recommended supplements:  Potassium as directed with breakfast and dinner (prescription)  Fiber capsules 1-2 times daily or sugar-free fiber powder (as tolerated)   Colace (stool softener) as needed, up to 4 capsules/day  Calcium: 500-600 mg, pill form twice daily with breakfast or lunch AND dinner  Multivitamin tablet or capsule per day that is low in iron (i.e. Centrum Silver)  Magnesium: 200-250 mg, pill form with dinner or at bedtime  Bouillon cube one to two times per day as needed, if feeling dizzy   Beginning with the fourth morning of this diet, test urine every morning using Ketostix for ketone (fat) breakdown.

## 2024-05-20 ENCOUNTER — OFFICE VISIT (OUTPATIENT)
Dept: ENDOCRINOLOGY | Facility: CLINIC | Age: 74
End: 2024-05-20
Payer: MEDICARE

## 2024-05-20 VITALS — SYSTOLIC BLOOD PRESSURE: 128 MMHG | DIASTOLIC BLOOD PRESSURE: 74 MMHG | BODY MASS INDEX: 42.43 KG/M2 | WEIGHT: 232 LBS

## 2024-05-20 DIAGNOSIS — E88.810 DYSMETABOLIC SYNDROME: Primary | ICD-10-CM

## 2024-05-20 DIAGNOSIS — E78.5 HYPERLIPIDEMIA, UNSPECIFIED HYPERLIPIDEMIA TYPE: ICD-10-CM

## 2024-05-20 DIAGNOSIS — R60.9 EDEMA, UNSPECIFIED TYPE: ICD-10-CM

## 2024-05-20 PROCEDURE — 99214 OFFICE O/P EST MOD 30 MIN: CPT | Performed by: INTERNAL MEDICINE

## 2024-05-20 PROCEDURE — 1159F MED LIST DOCD IN RCRD: CPT | Performed by: INTERNAL MEDICINE

## 2024-05-20 PROCEDURE — 1160F RVW MEDS BY RX/DR IN RCRD: CPT | Performed by: INTERNAL MEDICINE

## 2024-05-20 PROCEDURE — 3008F BODY MASS INDEX DOCD: CPT | Performed by: INTERNAL MEDICINE

## 2024-05-20 NOTE — PROGRESS NOTES
Patient ID: Meghan Rinaldi is a 73 y.o. female who presents for Follow-up.  HPI  The patient comes in for follow up.    She has been on and off PSMF for management of insulin resistance with postprandial fatigue carbohydrate craving joint pain hyperlipidemia edema and GERD.    She states she is back on track and is lost about 5 pounds since seeing the nutritionist last month.    She has been walking daily.    Physically she has no other complaints.    ROS  Comprehensive review of systems is negative.    Objective   Physical Exam  Visit Vitals  /74      Vitals:    05/20/24 0900   Weight: 105 kg (232 lb)      Body mass index is 42.43 kg/m².      Weight 232 up 2 pounds still down 36    Eyes normal  ENT normal. No adenopathy  Thyroid palpable and normal. No nodules  Chest clear to auscultation  Heart sounds are normal  Abdomen nontender. Bowel sounds normal. No organomegaly  Feet are okay  Current Outpatient Medications   Medication Sig Dispense Refill    acetaminophen (TylenoL) 325 mg tablet Take 1 tablet (325 mg) by mouth every 6 hours if needed. 1 TO 2 TABLETS      ascorbic acid, vitamin C, 1,000 mg ER tablet Take 1 tablet (1,000 mg) by mouth once daily.      BIOFREEZE, MENTHOL, TOP Apply 1 Application topically 2 times a day as needed.      biotin 1 mg tablet Take 1 tablet (1 mg) by mouth. Monday, Wednesday, Friday      biotin 5 mg capsule Take 1 capsule (5 mg) by mouth once daily in the morning.      biotin/folic ac/vit Bcomp,C/Zn (BIOTIN FORTE ORAL) Take 1 tablet by mouth once daily. Takes one 5 mg tab daily      calcium carb/vitamin D3/vit K1 (VIACTIV ORAL) Take 1 tablet by mouth 2 times a day.      calcium polycarbophil (FIBER-CAPS, CA POLYCARBOPHIL, ORAL) Take 1 tablet by mouth once daily.      celecoxib (CeleBREX) 200 mg capsule Take 1 capsule (200 mg) by mouth twice a day.      cholecalciferol (Vitamin D-3) 50 mcg (2,000 unit) capsule Take 1 capsule (50 mcg) by mouth early in the morning..       diclofenac sodium 1.5 % drops 2 sprays 2 times a day.      docusate sodium (Colace) 100 mg capsule Take 1 capsule (100 mg) by mouth once daily.      ECHINACEA ORAL Take by mouth.      efinaconazole 10 % solution with applicator Apply to affected area.      famotidine-Ca carb-mag hydrox (Pepcid Complete) -165 mg chewable tablet Chew 1 tablet once daily at bedtime.      fexofenadine (Allegra Allergy) 180 mg tablet Take 1 tablet (180 mg) by mouth once daily.      fluticasone (Flonase Allergy Relief) 50 mcg/actuation nasal spray Administer 2 sprays into affected nostril(s) once daily.      glucosamine sulfate (Glucosamine) 500 mg tablet Take 1 tablet by mouth once daily. Takes 5 Total Restores daily      magnesium 200 mg tablet Take 1 tablet (200 mg) by mouth early in the morning.. Takes 250 mg daily      medroxyPROGESTERone (Provera) 10 mg tablet Take 1 tablet (10 mg) by mouth once daily.      mirabegron (Myrbetriq) 25 mg tablet extended release 24 hr 24 hr tablet 1 tablet (25 mg).      multivitamin capsule Take 1 capsule by mouth twice a day. Takes BALANCE OF NATURE      Myrbetriq 50 mg tablet extended release 24 hr 24 hr tablet Take 1 tablet (50 mg) by mouth once daily. 90 tablet 3    potassium chloride CR 10 mEq ER tablet Take 1 tablet (10 mEq) by mouth 2 times a day. 180 tablet 3    progesterone (Prometrium) 100 mg capsule Take 1 capsule (100 mg) by mouth once daily. Takes unknown dose 2 weeks every other month (Sees gyne)      rosuvastatin (Crestor) 5 mg tablet Take 1 tablet (5 mg) by mouth 1 (one) time per week.      traZODone (Desyrel) 50 mg tablet Take 1 tablet (50 mg) by mouth once daily at bedtime.      verapamil SR (Calan-SR) 180 mg ER tablet TAKE ONE TABLET BY MOUTH TWO TIMES A  tablet 0    ZINC ORAL Take 1 tablet by mouth once daily.       No current facility-administered medications for this visit.       Assessment/Plan     1.  Insulin resistance  2.  Hyperlipidemia  3.  Edema  4.  Joint  pain  5.  GERD    She is going to work to stay on track.  We discussed strategies for success. Planning ahead, taking things a week at a time and planning the week ahead of time.    She will work on exercise.    She will make sure she stays hydrated.    We will hold on blood work today.    She will continue her current regimen.    Follow up with the nutritionist in 1 month, me in 2 months, sooner as needed.

## 2024-06-04 ENCOUNTER — OFFICE VISIT (OUTPATIENT)
Dept: NEUROLOGY | Facility: CLINIC | Age: 74
End: 2024-06-04
Payer: MEDICARE

## 2024-06-04 VITALS
HEART RATE: 69 BPM | SYSTOLIC BLOOD PRESSURE: 153 MMHG | HEIGHT: 62 IN | BODY MASS INDEX: 42.43 KG/M2 | DIASTOLIC BLOOD PRESSURE: 76 MMHG

## 2024-06-04 DIAGNOSIS — L23.7 POISON IVY DERMATITIS: ICD-10-CM

## 2024-06-04 DIAGNOSIS — Z87.898 HISTORY OF SYNCOPE: ICD-10-CM

## 2024-06-04 DIAGNOSIS — G43.009 MIGRAINE WITHOUT AURA AND WITHOUT STATUS MIGRAINOSUS, NOT INTRACTABLE: Primary | ICD-10-CM

## 2024-06-04 PROCEDURE — 99213 OFFICE O/P EST LOW 20 MIN: CPT | Performed by: PSYCHIATRY & NEUROLOGY

## 2024-06-04 PROCEDURE — 3008F BODY MASS INDEX DOCD: CPT | Performed by: PSYCHIATRY & NEUROLOGY

## 2024-06-04 PROCEDURE — 1036F TOBACCO NON-USER: CPT | Performed by: PSYCHIATRY & NEUROLOGY

## 2024-06-04 PROCEDURE — 1160F RVW MEDS BY RX/DR IN RCRD: CPT | Performed by: PSYCHIATRY & NEUROLOGY

## 2024-06-04 PROCEDURE — 1159F MED LIST DOCD IN RCRD: CPT | Performed by: PSYCHIATRY & NEUROLOGY

## 2024-06-04 RX ORDER — METHYLPREDNISOLONE 4 MG/1
TABLET ORAL
Qty: 21 TABLET | Refills: 0 | Status: SHIPPED | OUTPATIENT
Start: 2024-06-04 | End: 2024-06-11
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
## 2024-06-11 ENCOUNTER — APPOINTMENT (OUTPATIENT)
Dept: ENDOCRINOLOGY | Facility: CLINIC | Age: 74
End: 2024-06-11
Payer: MEDICARE

## 2024-06-21 ENCOUNTER — APPOINTMENT (OUTPATIENT)
Dept: PRIMARY CARE | Facility: CLINIC | Age: 74
End: 2024-06-21
Payer: MEDICARE

## 2024-07-01 DIAGNOSIS — G43.909 MIGRAINE WITHOUT STATUS MIGRAINOSUS, NOT INTRACTABLE, UNSPECIFIED MIGRAINE TYPE: ICD-10-CM

## 2024-07-01 RX ORDER — VERAPAMIL HYDROCHLORIDE 180 MG/1
180 TABLET, FILM COATED, EXTENDED RELEASE ORAL 2 TIMES DAILY
Qty: 180 TABLET | Refills: 0 | Status: SHIPPED | OUTPATIENT
Start: 2024-07-01

## 2024-07-15 ENCOUNTER — APPOINTMENT (OUTPATIENT)
Dept: ENDOCRINOLOGY | Facility: CLINIC | Age: 74
End: 2024-07-15
Payer: MEDICARE

## 2024-07-29 DIAGNOSIS — N39.41 URGE INCONTINENCE: ICD-10-CM

## 2024-07-30 RX ORDER — MIRABEGRON 50 MG/1
50 TABLET, FILM COATED, EXTENDED RELEASE ORAL DAILY
Qty: 90 TABLET | Refills: 0 | Status: SHIPPED | OUTPATIENT
Start: 2024-07-30

## 2024-08-02 DIAGNOSIS — Z12.11 COLON CANCER SCREENING: ICD-10-CM

## 2024-08-02 RX ORDER — POLYETHYLENE GLYCOL 3350, SODIUM CHLORIDE, SODIUM BICARBONATE, POTASSIUM CHLORIDE 420; 11.2; 5.72; 1.48 G/4L; G/4L; G/4L; G/4L
POWDER, FOR SOLUTION ORAL
Qty: 4000 ML | Refills: 0 | Status: SHIPPED | OUTPATIENT
Start: 2024-08-02

## 2024-08-07 ENCOUNTER — APPOINTMENT (OUTPATIENT)
Dept: ENDOCRINOLOGY | Facility: CLINIC | Age: 74
End: 2024-08-07
Payer: MEDICARE

## 2024-08-07 VITALS — SYSTOLIC BLOOD PRESSURE: 124 MMHG | BODY MASS INDEX: 43.9 KG/M2 | DIASTOLIC BLOOD PRESSURE: 78 MMHG | WEIGHT: 240 LBS

## 2024-08-07 DIAGNOSIS — E88.810 DYSMETABOLIC SYNDROME: Primary | ICD-10-CM

## 2024-08-07 DIAGNOSIS — E78.5 HYPERLIPIDEMIA, UNSPECIFIED HYPERLIPIDEMIA TYPE: ICD-10-CM

## 2024-08-07 DIAGNOSIS — R60.9 EDEMA, UNSPECIFIED TYPE: ICD-10-CM

## 2024-08-07 PROCEDURE — 99214 OFFICE O/P EST MOD 30 MIN: CPT | Performed by: INTERNAL MEDICINE

## 2024-08-07 NOTE — PROGRESS NOTES
Patient ID: Meghan Rinaldi is a 73 y.o. female who presents for Follow-up.  HPI  The patient comes in for follow up.    She has been on and off PSMF for management of insulin resistance with postprandial fatigue carbohydrate craving joint pain hyperlipidemia edema and GERD.    Since last being seen she had poison ivy and was on prednisone.    She is going to get back on track.    She has been walking daily.    Physically she has no other complaints.    ROS  Comprehensive review of systems is negative.    Objective   Physical Exam  Visit Vitals  /78      Vitals:    08/07/24 0840   Weight: 109 kg (240 lb)      Body mass index is 43.9 kg/m².      Weight 240 8 pounds still down 28    Eyes normal  ENT normal. No adenopathy  Thyroid palpable and normal. No nodules  Chest clear to auscultation  Heart sounds are normal  Abdomen nontender. Bowel sounds normal. No organomegaly  Feet are okay    Current Outpatient Medications   Medication Sig Dispense Refill    acetaminophen (TylenoL) 325 mg tablet Take 1 tablet (325 mg) by mouth every 6 hours if needed. 1 TO 2 TABLETS      ascorbic acid, vitamin C, 1,000 mg ER tablet Take 1 tablet (1,000 mg) by mouth once daily.      BIOFREEZE, MENTHOL, TOP Apply 1 Application topically 2 times a day as needed.      biotin 1 mg tablet Take 1 tablet (1 mg) by mouth. Monday, Wednesday, Friday      biotin 5 mg capsule Take 1 capsule (5 mg) by mouth once daily in the morning.      biotin/folic ac/vit Bcomp,C/Zn (BIOTIN FORTE ORAL) Take 1 tablet by mouth once daily. Takes one 5 mg tab daily      calcium carb/vitamin D3/vit K1 (VIACTIV ORAL) Take 1 tablet by mouth 2 times a day.      calcium polycarbophil (FIBER-CAPS, CA POLYCARBOPHIL, ORAL) Take 1 tablet by mouth once daily.      celecoxib (CeleBREX) 200 mg capsule Take 1 capsule (200 mg) by mouth twice a day.      cholecalciferol (Vitamin D-3) 50 mcg (2,000 unit) capsule Take 1 capsule (50 mcg) by mouth early in the morning..      diclofenac  sodium 1.5 % drops 2 sprays 2 times a day.      docusate sodium (Colace) 100 mg capsule Take 1 capsule (100 mg) by mouth once daily.      ECHINACEA ORAL Take by mouth.      efinaconazole 10 % solution with applicator Apply to affected area.      famotidine-Ca carb-mag hydrox (Pepcid Complete) -165 mg chewable tablet Chew 1 tablet once daily at bedtime.      fexofenadine (Allegra Allergy) 180 mg tablet Take 1 tablet (180 mg) by mouth once daily.      fluticasone (Flonase Allergy Relief) 50 mcg/actuation nasal spray Administer 2 sprays into affected nostril(s) once daily.      glucosamine sulfate (Glucosamine) 500 mg tablet Take 1 tablet by mouth once daily. Takes 5 Total Restores daily      magnesium 200 mg tablet Take 1 tablet (200 mg) by mouth early in the morning.. Takes 250 mg daily      medroxyPROGESTERone (Provera) 10 mg tablet Take 1 tablet (10 mg) by mouth once daily.      mirabegron (Myrbetriq) 25 mg tablet extended release 24 hr 24 hr tablet 1 tablet (25 mg).      multivitamin capsule Take 1 capsule by mouth twice a day. Takes BALANCE OF NATURE      Myrbetriq 50 mg tablet extended release 24 hr 24 hr tablet TAKE ONE TABLET BY MOUTH EVERY DAY 90 tablet 0    polyethylene glycol-electrolytes (Nulytely) 420 gram solution Drink 1/2 starting at 6 pm the night before your procedure then drink the 2nd 1/2 5 hours before procedure arrival time 4000 mL 0    potassium chloride CR 10 mEq ER tablet Take 1 tablet (10 mEq) by mouth 2 times a day. 180 tablet 3    progesterone (Prometrium) 100 mg capsule Take 1 capsule (100 mg) by mouth once daily. Takes unknown dose 2 weeks every other month (Sees gyne)      rosuvastatin (Crestor) 5 mg tablet Take 1 tablet (5 mg) by mouth 1 (one) time per week.      traZODone (Desyrel) 50 mg tablet Take 1 tablet (50 mg) by mouth once daily at bedtime.      verapamil SR (Calan-SR) 180 mg ER tablet TAKE ONE TABLET BY MOUTH TWO TIMES A  tablet 0    ZINC ORAL Take 1 tablet by  mouth once daily.       No current facility-administered medications for this visit.       Assessment/Plan     1.  Insulin resistance  2.  Hyperlipidemia  3.  Edema  4.  Joint pain  5.  GERD    She is down to work to get back and stay on track.  We discussed strategies for success. Planning ahead, taking things a week at a time and planning the week ahead of time.    She will continue her current regimen.    She will make sure she stays hydrated.    She will work on exercise.    Follow up with the nutritionist in 1 month, me in 2 months, sooner as needed.

## 2024-08-21 ENCOUNTER — TELEPHONE (OUTPATIENT)
Dept: OBSTETRICS AND GYNECOLOGY | Facility: CLINIC | Age: 74
End: 2024-08-21
Payer: MEDICARE

## 2024-08-21 ENCOUNTER — TELEPHONE (OUTPATIENT)
Dept: PRIMARY CARE | Facility: CLINIC | Age: 74
End: 2024-08-21

## 2024-08-22 ENCOUNTER — PROCEDURE VISIT (OUTPATIENT)
Dept: OBSTETRICS AND GYNECOLOGY | Facility: CLINIC | Age: 74
End: 2024-08-22
Payer: MEDICARE

## 2024-08-22 VITALS — DIASTOLIC BLOOD PRESSURE: 60 MMHG | SYSTOLIC BLOOD PRESSURE: 112 MMHG | WEIGHT: 239.8 LBS | BODY MASS INDEX: 43.86 KG/M2

## 2024-08-22 DIAGNOSIS — E88.810 DYSMETABOLIC SYNDROME: ICD-10-CM

## 2024-08-22 DIAGNOSIS — E78.5 HYPERLIPIDEMIA, UNSPECIFIED HYPERLIPIDEMIA TYPE: Primary | ICD-10-CM

## 2024-08-22 DIAGNOSIS — N95.0 POSTMENOPAUSAL BLEEDING: Primary | ICD-10-CM

## 2024-08-22 DIAGNOSIS — E55.9 VITAMIN D DEFICIENCY: ICD-10-CM

## 2024-08-22 DIAGNOSIS — Z00.00 MEDICARE ANNUAL WELLNESS VISIT, SUBSEQUENT: ICD-10-CM

## 2024-08-22 PROCEDURE — 88305 TISSUE EXAM BY PATHOLOGIST: CPT

## 2024-08-22 PROCEDURE — 58100 BIOPSY OF UTERUS LINING: CPT | Performed by: OBSTETRICS & GYNECOLOGY

## 2024-08-22 NOTE — PROGRESS NOTES
ASSESSMENT/PLAN  Postmenopausal bleeding  Endometrial biopsy done today.   Results by phone within one week.  Placed an order for a pelvic ultrasound.  - US PELVIS TRANSABDOMINAL WITH TRANSVAGINAL; Future      SUBJECTIVE    HPI    73 yo presents with postmenopausal bleeding. Had one episode yesterday, noted when wiping post urination, approx 5cc worth per pt. No urinary sx, no cramping.   Last visit 7/2023.    GYN hx as follows:  4/2022 Hysteroscopy dilation and curettage for postmenopausal bleeding. Path disordered proliferative. After d/w GYN/ONC, recommendation made for cyclic progesterone. 12/2022 Endometrial biopsy repeated and was inactive endometrium. Pt continued to have bleeding post medroxyprogesterone but it was light. Stopped medroxyprogesterone after last visit 1/2024.   3/4/2022 last pelvic ultrasound : The uterus is 6.3 x 2.7 by 4.1 cm. There is a small fibroid measuring  approximately 14 mm in greatest diameter. Endometrial stripe is thickened for a postmenopausal patient  measuring 6 mm. Ovaries not seen bilaterally.      OBJECTIVE    /60   Wt 109 kg (239 lb 12.8 oz)   BMI 43.86 kg/m²     Physical Exam     Constitutional: Alert and in no acute distress. Well developed, well nourished   Genitourinary: external genitalia: normal, perianal area: normal   Vagina: normal.   Cervix: Normal appearing without lesions.  Psychiatric: alert and oriented x 3., affect normal to patient baseline and mood: appropriate          Endometrial biopsy    Date/Time: 8/22/2024 1:48 PM    Performed by: Reba Mark MD  Authorized by: Reba Mark MD    Consent:     Consent obtained: written    Consent given by: patient    Risks discussed: bleeding, infection and pain  Indications:     Indications: postmenopausal bleeding    Procedure:     Prepped with: Betadine    Tenaculum used: yes      A local block was performed: yes      Block method: paracervical block      Local anesthetic: lidocaine 1% w/o epi     Amount used (mL): 3    Number of passes: 4  Findings:     Cervix: normal      Uterus depth by sound (cm): 7    Specimen collected: specimen collected and sent to pathology      Patient tolerance: tolerated well, no immediate complications           Reba Mark MD

## 2024-08-23 ENCOUNTER — LAB (OUTPATIENT)
Dept: LAB | Facility: LAB | Age: 74
End: 2024-08-23
Payer: MEDICARE

## 2024-08-23 DIAGNOSIS — E78.5 HYPERLIPIDEMIA, UNSPECIFIED HYPERLIPIDEMIA TYPE: ICD-10-CM

## 2024-08-23 DIAGNOSIS — Z00.00 MEDICARE ANNUAL WELLNESS VISIT, SUBSEQUENT: ICD-10-CM

## 2024-08-23 DIAGNOSIS — E88.810 DYSMETABOLIC SYNDROME: ICD-10-CM

## 2024-08-23 LAB
ALBUMIN SERPL BCP-MCNC: 4.1 G/DL (ref 3.4–5)
ALP SERPL-CCNC: 90 U/L (ref 33–136)
ALT SERPL W P-5'-P-CCNC: 18 U/L (ref 7–45)
ANION GAP SERPL CALC-SCNC: 15 MMOL/L (ref 10–20)
AST SERPL W P-5'-P-CCNC: 19 U/L (ref 9–39)
BILIRUB SERPL-MCNC: 0.4 MG/DL (ref 0–1.2)
BUN SERPL-MCNC: 21 MG/DL (ref 6–23)
CALCIUM SERPL-MCNC: 9.7 MG/DL (ref 8.6–10.3)
CHLORIDE SERPL-SCNC: 103 MMOL/L (ref 98–107)
CHOLEST SERPL-MCNC: 161 MG/DL (ref 0–199)
CHOLESTEROL/HDL RATIO: 2.4
CO2 SERPL-SCNC: 29 MMOL/L (ref 21–32)
CREAT SERPL-MCNC: 0.87 MG/DL (ref 0.5–1.05)
EGFRCR SERPLBLD CKD-EPI 2021: 70 ML/MIN/1.73M*2
ERYTHROCYTE [DISTWIDTH] IN BLOOD BY AUTOMATED COUNT: 12.8 % (ref 11.5–14.5)
GLUCOSE SERPL-MCNC: 92 MG/DL (ref 74–99)
HCT VFR BLD AUTO: 45.5 % (ref 36–46)
HDLC SERPL-MCNC: 67.9 MG/DL
HGB BLD-MCNC: 14.5 G/DL (ref 12–16)
LDLC SERPL CALC-MCNC: 78 MG/DL
MCH RBC QN AUTO: 31.5 PG (ref 26–34)
MCHC RBC AUTO-ENTMCNC: 31.9 G/DL (ref 32–36)
MCV RBC AUTO: 99 FL (ref 80–100)
NON HDL CHOLESTEROL: 93 MG/DL (ref 0–149)
NRBC BLD-RTO: 0 /100 WBCS (ref 0–0)
PLATELET # BLD AUTO: 232 X10*3/UL (ref 150–450)
POTASSIUM SERPL-SCNC: 4.5 MMOL/L (ref 3.5–5.3)
PROT SERPL-MCNC: 6.5 G/DL (ref 6.4–8.2)
RBC # BLD AUTO: 4.61 X10*6/UL (ref 4–5.2)
SODIUM SERPL-SCNC: 142 MMOL/L (ref 136–145)
TRIGL SERPL-MCNC: 77 MG/DL (ref 0–149)
VLDL: 15 MG/DL (ref 0–40)
WBC # BLD AUTO: 7.8 X10*3/UL (ref 4.4–11.3)

## 2024-08-23 PROCEDURE — 85027 COMPLETE CBC AUTOMATED: CPT

## 2024-08-23 PROCEDURE — 80053 COMPREHEN METABOLIC PANEL: CPT

## 2024-08-23 PROCEDURE — 36415 COLL VENOUS BLD VENIPUNCTURE: CPT

## 2024-08-23 PROCEDURE — 80061 LIPID PANEL: CPT

## 2024-08-26 ENCOUNTER — TELEPHONE (OUTPATIENT)
Dept: GASTROENTEROLOGY | Facility: CLINIC | Age: 74
End: 2024-08-26
Payer: MEDICARE

## 2024-08-26 NOTE — TELEPHONE ENCOUNTER
"----- Message from Brianna PAN sent at 8/26/2024  2:43 PM EDT -----  Regarding: RE: PREP  I called pt and left a messsage and no returned phone call      ----- Message -----  From: Aden Canales MD  Sent: 8/22/2024   3:23 PM EDT  To: Brianna Prieto MA  Subject: FW: PREP                                         I'm not sure what this patient is talking about.  I did her colonoscopy in 2019, but there is no documentation about any special prep.  Can you call her and try to get details of exactly what she did for a \"5-day prep\"?    Thanks      ----- Message -----  From: Ophelia Segovia MA  Sent: 8/22/2024   1:54 PM EDT  To: Aden Canales MD  Subject: PREP                                             Frantic patient walked in to [facility] today, has upcoming Colonoscopy with you in September and states she needs 5 day prep because she has an allergy to regular prep and it gives her diarrhea.  States she did 5 day prep last time.  I do not know what a 5-day prep is so I could not help patient.  "

## 2024-08-28 ENCOUNTER — HOSPITAL ENCOUNTER (OUTPATIENT)
Dept: RADIOLOGY | Facility: HOSPITAL | Age: 74
Discharge: HOME | End: 2024-08-28
Payer: MEDICARE

## 2024-08-28 DIAGNOSIS — N95.0 POSTMENOPAUSAL BLEEDING: ICD-10-CM

## 2024-08-28 PROCEDURE — 76830 TRANSVAGINAL US NON-OB: CPT | Performed by: RADIOLOGY

## 2024-08-28 PROCEDURE — 76856 US EXAM PELVIC COMPLETE: CPT

## 2024-08-28 PROCEDURE — 76857 US EXAM PELVIC LIMITED: CPT | Performed by: RADIOLOGY

## 2024-09-04 ENCOUNTER — APPOINTMENT (OUTPATIENT)
Dept: PRIMARY CARE | Facility: CLINIC | Age: 74
End: 2024-09-04
Payer: MEDICARE

## 2024-09-04 VITALS
DIASTOLIC BLOOD PRESSURE: 80 MMHG | OXYGEN SATURATION: 94 % | WEIGHT: 238 LBS | HEIGHT: 62 IN | BODY MASS INDEX: 43.79 KG/M2 | SYSTOLIC BLOOD PRESSURE: 114 MMHG | HEART RATE: 74 BPM

## 2024-09-04 DIAGNOSIS — Z86.39 HISTORY OF HYPERLIPIDEMIA: ICD-10-CM

## 2024-09-04 DIAGNOSIS — G43.909 MIGRAINE WITHOUT STATUS MIGRAINOSUS, NOT INTRACTABLE, UNSPECIFIED MIGRAINE TYPE: ICD-10-CM

## 2024-09-04 DIAGNOSIS — Z00.00 MEDICARE ANNUAL WELLNESS VISIT, SUBSEQUENT: Primary | ICD-10-CM

## 2024-09-04 DIAGNOSIS — Z12.31 ENCOUNTER FOR SCREENING MAMMOGRAM FOR MALIGNANT NEOPLASM OF BREAST: ICD-10-CM

## 2024-09-04 DIAGNOSIS — E88.810 DYSMETABOLIC SYNDROME: ICD-10-CM

## 2024-09-04 LAB
LABORATORY COMMENT REPORT: NORMAL
PATH REPORT.FINAL DX SPEC: NORMAL
PATH REPORT.GROSS SPEC: NORMAL
PATH REPORT.RELEVANT HX SPEC: NORMAL
PATH REPORT.TOTAL CANCER: NORMAL

## 2024-09-04 PROCEDURE — 1123F ACP DISCUSS/DSCN MKR DOCD: CPT | Performed by: SPECIALIST

## 2024-09-04 PROCEDURE — 1160F RVW MEDS BY RX/DR IN RCRD: CPT | Performed by: SPECIALIST

## 2024-09-04 PROCEDURE — 3008F BODY MASS INDEX DOCD: CPT | Performed by: SPECIALIST

## 2024-09-04 PROCEDURE — 1158F ADVNC CARE PLAN TLK DOCD: CPT | Performed by: SPECIALIST

## 2024-09-04 PROCEDURE — 1036F TOBACCO NON-USER: CPT | Performed by: SPECIALIST

## 2024-09-04 PROCEDURE — 1159F MED LIST DOCD IN RCRD: CPT | Performed by: SPECIALIST

## 2024-09-04 PROCEDURE — 1170F FXNL STATUS ASSESSED: CPT | Performed by: SPECIALIST

## 2024-09-04 PROCEDURE — G0439 PPPS, SUBSEQ VISIT: HCPCS | Performed by: SPECIALIST

## 2024-09-04 ASSESSMENT — ENCOUNTER SYMPTOMS
DEPRESSION: 0
LOSS OF SENSATION IN FEET: 0
OCCASIONAL FEELINGS OF UNSTEADINESS: 0

## 2024-09-04 ASSESSMENT — PATIENT HEALTH QUESTIONNAIRE - PHQ9
SUM OF ALL RESPONSES TO PHQ9 QUESTIONS 1 AND 2: 0
1. LITTLE INTEREST OR PLEASURE IN DOING THINGS: NOT AT ALL
2. FEELING DOWN, DEPRESSED OR HOPELESS: NOT AT ALL

## 2024-09-04 ASSESSMENT — ACTIVITIES OF DAILY LIVING (ADL)
BATHING: INDEPENDENT
MANAGING_FINANCES: INDEPENDENT
DRESSING: INDEPENDENT
DOING_HOUSEWORK: INDEPENDENT
TAKING_MEDICATION: INDEPENDENT
GROCERY_SHOPPING: INDEPENDENT

## 2024-09-04 NOTE — PROGRESS NOTES
Subjective   Patient ID: Meghan Rinaldi is a 73 y.o. female who presents for Annual Exam.  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  Said principal threatened her.      Had vaginal bleeding, had bx with gynecology, finally stopped when she went for US and lost power in the middle of it.  Fibroid is gone everything is fine, biopsy has normal cells.      Colonoscopy planned for the    Said she cannot get them to understand she is allergic to regular prep and needs 5 day prep because of her mercury allergy     Stable on PSMF diet with Endo   Was on prednisone for poison ivy and caused weight gain    Some days mybetriqu works well x 3 days and not other days     Saw colleague regarding ears    Discussed her  and said after CT cardiac Score is back on ASA and will see cardio Dr. Soto    Has living will, plans health care power of   , Micky, would make medical decisions if she were unable    Goals of Care:  Treat treatable conditions--especially a drug reaction, but would not want to prolong the act of dying through extraordinary measures if no hope for meaningful recovery    Allergies   Allergen Reactions    Bee Venom Protein (Honey Bee) Anaphylaxis    Sulfa (Sulfonamide Antibiotics) Rash    Diclofenac Sodium Other     shingles    Doxycycline Other and Unknown     Shakes for a full weeks    Erythromycin Other     Respitory    Gabapentin Other     Tolerates brand    Other Other     Mercury (preservative) causes severe diarrhea USED in all GENERIC MEDS. TO HAVE NO GENERIC MEDS    Preservative Other      Contact solution, respiratory, diarrhea    Rosuvastatin Other     Couldn't get in brand name    Thimerosal Other     Is allergic to mercury    Topiramate Other     States she becomes manic.    Amoxicillin Diarrhea and Rash    Cephalexin Rash and Other      Current Outpatient Medications   Medication Instructions    acetaminophen (TylenoL) 325 mg tablet 1 tablet, oral, Every 6 hours PRN, 1 TO 2 TABLETS    BIOFREEZE, MENTHOL, TOP 1 Application, Topical, 2 times daily PRN    biotin 5 mg, oral, Every morning    calcium carb/vitamin D3/vit K1 (VIACTIV ORAL) oral, 2 times daily    calcium polycarbophil (FIBER-CAPS, CA POLYCARBOPHIL, ORAL) 1 tablet, oral, Daily    cholecalciferol (VITAMIN D-3) 50 mcg, oral, Daily    docusate sodium (COLACE) 100 mg, oral, Daily    famotidine-Ca carb-mag hydrox (Pepcid Complete) -165 mg chewable tablet 1 tablet, oral, Nightly    fexofenadine (Allegra Allergy) 180 mg tablet 1 tablet, oral, Daily    fluticasone (Flonase Allergy Relief) 50 mcg/actuation nasal spray 2 sprays, nasal, Daily    magnesium 200 mg tablet 1 tablet, oral, Daily, Takes 250 mg daily    multivitamin capsule 1 capsule, oral, 2 times daily, Takes BALANCE OF NATURE    Myrbetriq 50 mg, oral, Daily    polyethylene glycol-electrolytes (Nulytely) 420 gram solution Drink 1/2 starting at 6 pm the night before your procedure then drink the 2nd 1/2 5 hours before procedure arrival time    potassium chloride CR 10 mEq ER tablet 10 mEq, oral, 2 times daily    verapamil SR (CALAN-SR) 180 mg, oral, 2 times daily        Review of Systems  Constitutional  Some decreased energy since she hasn't been exercising due to vaginal bleeding, no fevers, no chills, no unintentional weight loss (lost 2 pounds intentionally)  HEENT:  No headaches, no dizziness, no double vision, no blurred vision, eye exams current, no hearing loss  Cardiovascular:  No chest pain, no palpitations, no shortness of breath with exertion (one flight of stairs),  "  Respiratory:  Still has \"covid\" cough, no hemoptysis, no wheezing, No shortness of breath at rest  GI:  No dysphagia, no odynophagia, Occasional (on pepcid) reflux, no abdominal pain, no nausea, no vomiting, no changes in bowel habits, no bright red blood per rectum, no melena  :  No urinary frequency, no dysuria, some times urine incontinence  MSK:  No falls, no joint pain, no joint swelling  Neuro:  No tremors, no extremity weakness, no changes in sensation    Physical Exam  /80   Pulse 74   Ht 1.575 m (5' 2\")   Wt 108 kg (238 lb)   SpO2 94%   BMI 43.53 kg/m²   114/80  General:    Well-appearing  F in no acute distress, well nourished, well hydrated  Head:  Normocephalic, atraumatic  Skin:          Warm dry,   Eyes:  Anicteric sclera, pupils equal,   Ears:        TMs intact  Oral:      Not examined due to pandemic  Neck:   Supple, no cervical/supraclavicular adenopathy, no thyromegaly or nodules appreciated on exam  Cor:      Regular rate, normal S1, S2, no murmurs appreciated, no S3, no S4   Lungs:   Clear to auscultation b/l, no wheezes, no rhonchi, no crackles, no accessory respiratory muscle use  Abd:          Soft, nontender, no guarding, no rebound, no hepatosplenomegaly appreciated   Ext:            No lower extremity edema, no palpable cords  Pulses:      Pedal pulses intact  Neuro:   CN2-12 grossly intact (except funduscopic exam not performed)  Breasts:     Declined just saw with gynecology    Assessment/Plan     MAW  -Does not get influenza vaccine   -Declines Covid vaccines   -Opted against Shingrix vaccines  -Opted against RSV vaccine  -Opted against Prevnar 20  -Plans colonoscopy  -DXA 7/27/2023 normal  -Mammog 7/27/2023, ordered  -Sees dentist every 4 months  -Sees dermatology annually  -Needs hep c screen next visit  -Labs reviewed and discussed:  CBC CMP LDL 78    Screening for breast cancer  -Mammog 7/27/2023, ordered    Migraines  -Stable on verapamil   -Mgmt per neurology    Hx " of Hyperlipidemia  -Intolerant of generic medications  -Previously took Crestor Brand necessary 3 days per week  -LDL 78     Insulin resistance, Obesity BMI 45  -Continues to follow PSMF diet with Endocrine  -Gained some weight after course of prednisone for poison ivy  -Actively working toward weight loss    Urge incontinence  -On myrbetriq but notes some days works better than others       Maki Michel, DO

## 2024-09-10 ENCOUNTER — HOSPITAL ENCOUNTER (OUTPATIENT)
Dept: RADIOLOGY | Facility: HOSPITAL | Age: 74
Discharge: HOME | End: 2024-09-10
Payer: MEDICARE

## 2024-09-10 VITALS — WEIGHT: 238 LBS | HEIGHT: 62 IN | BODY MASS INDEX: 43.79 KG/M2

## 2024-09-10 DIAGNOSIS — Z12.31 ENCOUNTER FOR SCREENING MAMMOGRAM FOR MALIGNANT NEOPLASM OF BREAST: ICD-10-CM

## 2024-09-10 PROBLEM — Z86.39 HISTORY OF HYPERLIPIDEMIA: Status: ACTIVE | Noted: 2024-09-10

## 2024-09-10 PROBLEM — E78.5 HYPERLIPIDEMIA: Status: RESOLVED | Noted: 2023-06-23 | Resolved: 2024-09-10

## 2024-09-10 PROBLEM — E66.01 MORBID (SEVERE) OBESITY DUE TO EXCESS CALORIES (MULTI): Status: RESOLVED | Noted: 2023-08-22 | Resolved: 2024-09-10

## 2024-09-10 PROBLEM — Z00.00 MEDICARE ANNUAL WELLNESS VISIT, SUBSEQUENT: Status: ACTIVE | Noted: 2024-09-10

## 2024-09-10 PROCEDURE — 77067 SCR MAMMO BI INCL CAD: CPT

## 2024-09-10 PROCEDURE — 77067 SCR MAMMO BI INCL CAD: CPT | Performed by: RADIOLOGY

## 2024-09-10 PROCEDURE — 77063 BREAST TOMOSYNTHESIS BI: CPT | Performed by: RADIOLOGY

## 2024-09-20 ENCOUNTER — APPOINTMENT (OUTPATIENT)
Dept: GASTROENTEROLOGY | Facility: EXTERNAL LOCATION | Age: 74
End: 2024-09-20
Payer: MEDICARE

## 2024-09-23 DIAGNOSIS — G43.909 MIGRAINE WITHOUT STATUS MIGRAINOSUS, NOT INTRACTABLE, UNSPECIFIED MIGRAINE TYPE: ICD-10-CM

## 2024-09-24 RX ORDER — VERAPAMIL HYDROCHLORIDE 180 MG/1
180 TABLET, EXTENDED RELEASE ORAL 2 TIMES DAILY
Qty: 180 TABLET | Refills: 0 | Status: SHIPPED | OUTPATIENT
Start: 2024-09-24

## 2024-09-26 ENCOUNTER — APPOINTMENT (OUTPATIENT)
Dept: GASTROENTEROLOGY | Facility: EXTERNAL LOCATION | Age: 74
End: 2024-09-26
Payer: MEDICARE

## 2024-10-24 DIAGNOSIS — N39.41 URGE INCONTINENCE: ICD-10-CM

## 2024-10-24 RX ORDER — MIRABEGRON 50 MG/1
50 TABLET, FILM COATED, EXTENDED RELEASE ORAL DAILY
Qty: 90 TABLET | Refills: 3 | Status: SHIPPED | OUTPATIENT
Start: 2024-10-24

## 2024-11-14 ENCOUNTER — APPOINTMENT (OUTPATIENT)
Dept: ENDOCRINOLOGY | Facility: CLINIC | Age: 74
End: 2024-11-14
Payer: MEDICARE

## 2024-11-14 VITALS — WEIGHT: 236 LBS | BODY MASS INDEX: 43.16 KG/M2

## 2024-11-14 DIAGNOSIS — R60.9 EDEMA, UNSPECIFIED TYPE: ICD-10-CM

## 2024-11-14 DIAGNOSIS — E78.5 HYPERLIPIDEMIA, UNSPECIFIED HYPERLIPIDEMIA TYPE: ICD-10-CM

## 2024-11-14 DIAGNOSIS — M17.9 OSTEOARTHRITIS OF KNEE, UNSPECIFIED LATERALITY, UNSPECIFIED OSTEOARTHRITIS TYPE: ICD-10-CM

## 2024-11-14 DIAGNOSIS — E88.810 DYSMETABOLIC SYNDROME: Primary | ICD-10-CM

## 2024-11-14 PROCEDURE — 99214 OFFICE O/P EST MOD 30 MIN: CPT | Performed by: INTERNAL MEDICINE

## 2024-11-14 NOTE — PROGRESS NOTES
Patient ID: Meghan Rinaldi is a 74 y.o. female who presents for Follow-up.  HPI  The patient comes in for follow up.    She has been on and off PSMF for management of insulin resistance with postprandial fatigue carbohydrate craving joint pain hyperlipidemia edema and GERD.    Since last being seen she was sick for about 6 weeks after reaction from a colonoscopy prep.    She is going to get back on track.    She has been walking daily.    Physically she has no other complaints.      ROS  Comprehensive review of systems is negative.    Objective   Physical Exam  There were no vitals taken for this visit.   Vitals:    11/14/24 1537   Weight: 107 kg (236 lb)      Body mass index is 43.16 kg/m².      Weight 236 down 4 pounds for total of 32    Eyes normal  ENT normal. No adenopathy  Thyroid palpable and normal. No nodules  Chest clear to auscultation  Heart sounds are normal  Abdomen nontender. Bowel sounds normal. No organomegaly  Feet are okay    Current Outpatient Medications   Medication Sig Dispense Refill    acetaminophen (TylenoL) 325 mg tablet Take 1 tablet (325 mg) by mouth every 6 hours if needed. 1 TO 2 TABLETS      BIOFREEZE, MENTHOL, TOP Apply 1 Application topically 2 times a day as needed.      biotin 5 mg capsule Take 1 capsule (5 mg) by mouth once daily in the morning.      calcium carb/vitamin D3/vit K1 (VIACTIV ORAL) Take by mouth 2 times a day.      calcium polycarbophil (FIBER-CAPS, CA POLYCARBOPHIL, ORAL) Take 1 tablet by mouth once daily.      cholecalciferol (Vitamin D-3) 50 mcg (2,000 unit) capsule Take 1 capsule (50 mcg) by mouth early in the morning..      docusate sodium (Colace) 100 mg capsule Take 1 capsule (100 mg) by mouth once daily.      famotidine-Ca carb-mag hydrox (Pepcid Complete) -165 mg chewable tablet Chew 1 tablet once daily at bedtime.      fexofenadine (Allegra Allergy) 180 mg tablet Take 1 tablet (180 mg) by mouth once daily.      fluticasone (Flonase Allergy Relief) 50  mcg/actuation nasal spray Administer 2 sprays into affected nostril(s) once daily.      magnesium 200 mg tablet Take 1 tablet (200 mg) by mouth early in the morning.. Takes 250 mg daily      multivitamin capsule Take 1 capsule by mouth twice a day. Takes BALANCE OF NATURE      Myrbetriq 50 mg tablet extended release 24 hr 24 hr tablet Take 1 tablet (50 mg) by mouth once daily. 90 tablet 3    polyethylene glycol-electrolytes (Nulytely) 420 gram solution Drink 1/2 starting at 6 pm the night before your procedure then drink the 2nd 1/2 5 hours before procedure arrival time 4000 mL 0    potassium chloride CR 10 mEq ER tablet Take 1 tablet (10 mEq) by mouth 2 times a day. 180 tablet 3    verapamil SR (Calan-SR) 180 mg ER tablet TAKE ONE TABLET BY MOUTH TWO TIMES A  tablet 0     No current facility-administered medications for this visit.       Assessment/Plan     1.  Insulin resistance  2.  Hyperlipidemia  3.  Edema  4.  Joint pain  5.  GERD    She will work to get back on track.  We discussed strategies for success. Planning ahead, taking things a week at a time and planning the week ahead of time.    She will continue her current regimen.    We discussed strategies for the holidays.    She will work on exercise.    Follow up with the nutritionist in 1 month, me in 2 months, sooner as needed.

## 2025-01-02 DIAGNOSIS — G43.909 MIGRAINE WITHOUT STATUS MIGRAINOSUS, NOT INTRACTABLE, UNSPECIFIED MIGRAINE TYPE: ICD-10-CM

## 2025-01-02 RX ORDER — VERAPAMIL HYDROCHLORIDE 180 MG/1
180 TABLET, EXTENDED RELEASE ORAL 2 TIMES DAILY
Qty: 180 TABLET | Refills: 0 | Status: SHIPPED | OUTPATIENT
Start: 2025-01-02

## 2025-01-24 ENCOUNTER — LAB (OUTPATIENT)
Dept: LAB | Facility: LAB | Age: 75
End: 2025-01-24
Payer: MEDICARE

## 2025-01-24 ENCOUNTER — APPOINTMENT (OUTPATIENT)
Dept: ENDOCRINOLOGY | Facility: CLINIC | Age: 75
End: 2025-01-24
Payer: MEDICARE

## 2025-01-24 VITALS — WEIGHT: 242 LBS | SYSTOLIC BLOOD PRESSURE: 122 MMHG | DIASTOLIC BLOOD PRESSURE: 78 MMHG | BODY MASS INDEX: 44.26 KG/M2

## 2025-01-24 DIAGNOSIS — L65.9 ALOPECIA: ICD-10-CM

## 2025-01-24 DIAGNOSIS — G43.909 MIGRAINE WITHOUT STATUS MIGRAINOSUS, NOT INTRACTABLE, UNSPECIFIED MIGRAINE TYPE: ICD-10-CM

## 2025-01-24 DIAGNOSIS — M17.9 OSTEOARTHRITIS OF KNEE, UNSPECIFIED LATERALITY, UNSPECIFIED OSTEOARTHRITIS TYPE: ICD-10-CM

## 2025-01-24 DIAGNOSIS — E78.5 HYPERLIPIDEMIA, UNSPECIFIED HYPERLIPIDEMIA TYPE: ICD-10-CM

## 2025-01-24 DIAGNOSIS — E88.810 DYSMETABOLIC SYNDROME: ICD-10-CM

## 2025-01-24 DIAGNOSIS — R60.9 EDEMA, UNSPECIFIED TYPE: ICD-10-CM

## 2025-01-24 DIAGNOSIS — E88.810 DYSMETABOLIC SYNDROME: Primary | ICD-10-CM

## 2025-01-24 DIAGNOSIS — D64.9 ANEMIA, UNSPECIFIED TYPE: ICD-10-CM

## 2025-01-24 LAB
ANION GAP SERPL CALC-SCNC: 13 MMOL/L (ref 10–20)
BUN SERPL-MCNC: 21 MG/DL (ref 6–23)
CALCIUM SERPL-MCNC: 10.8 MG/DL (ref 8.6–10.6)
CHLORIDE SERPL-SCNC: 105 MMOL/L (ref 98–107)
CO2 SERPL-SCNC: 31 MMOL/L (ref 21–32)
CREAT SERPL-MCNC: 0.89 MG/DL (ref 0.5–1.05)
EGFRCR SERPLBLD CKD-EPI 2021: 68 ML/MIN/1.73M*2
ERYTHROCYTE [DISTWIDTH] IN BLOOD BY AUTOMATED COUNT: 13.2 % (ref 11.5–14.5)
GLUCOSE SERPL-MCNC: 111 MG/DL (ref 74–99)
HCT VFR BLD AUTO: 44 % (ref 36–46)
HGB BLD-MCNC: 14.5 G/DL (ref 12–16)
IRON SATN MFR SERPL: 28 % (ref 25–45)
IRON SERPL-MCNC: 103 UG/DL (ref 35–150)
MCH RBC QN AUTO: 31.3 PG (ref 26–34)
MCHC RBC AUTO-ENTMCNC: 33 G/DL (ref 32–36)
MCV RBC AUTO: 95 FL (ref 80–100)
NRBC BLD-RTO: 0 /100 WBCS (ref 0–0)
PLATELET # BLD AUTO: 276 X10*3/UL (ref 150–450)
POTASSIUM SERPL-SCNC: 4.3 MMOL/L (ref 3.5–5.3)
RBC # BLD AUTO: 4.63 X10*6/UL (ref 4–5.2)
SODIUM SERPL-SCNC: 145 MMOL/L (ref 136–145)
TIBC SERPL-MCNC: 370 UG/DL (ref 240–445)
TSH SERPL-ACNC: 2.69 MIU/L (ref 0.44–3.98)
UIBC SERPL-MCNC: 267 UG/DL (ref 110–370)
WBC # BLD AUTO: 7.9 X10*3/UL (ref 4.4–11.3)

## 2025-01-24 PROCEDURE — 99214 OFFICE O/P EST MOD 30 MIN: CPT | Performed by: INTERNAL MEDICINE

## 2025-01-24 PROCEDURE — 84443 ASSAY THYROID STIM HORMONE: CPT

## 2025-01-24 PROCEDURE — 83540 ASSAY OF IRON: CPT

## 2025-01-24 PROCEDURE — 85027 COMPLETE CBC AUTOMATED: CPT

## 2025-01-24 PROCEDURE — 80048 BASIC METABOLIC PNL TOTAL CA: CPT

## 2025-01-24 PROCEDURE — 83550 IRON BINDING TEST: CPT

## 2025-01-24 RX ORDER — VERAPAMIL HYDROCHLORIDE 180 MG/1
180 TABLET, EXTENDED RELEASE ORAL 2 TIMES DAILY
Qty: 180 TABLET | Refills: 3 | Status: SHIPPED | OUTPATIENT
Start: 2025-01-24

## 2025-01-24 NOTE — PROGRESS NOTES
Patient ID: Meghan Rinaldi is a 74 y.o. female who presents for Follow-up.  HPI  The patient comes in for follow up.    She has been on and off PSMF for management of insulin resistance with postprandial fatigue carbohydrate craving joint pain hyperlipidemia edema and GERD.    She has been off track and has not been exercising because of her pool not being available.    She is going to get back on track.    She has been complaining of hair loss.    Physically she has no other complaints.    ROS  Comprehensive review of systems is negative.    Objective   Physical Exam  Visit Vitals  /78      Vitals:    01/24/25 0854   Weight: 110 kg (242 lb)      Body mass index is 44.26 kg/m².      Weight 242 up 6 pounds still down 26    Eyes normal  ENT normal. No adenopathy  Thyroid palpable and normal. No nodules  Chest clear to auscultation  Heart sounds are normal  Abdomen nontender. Bowel sounds normal. No organomegaly  Feet are okay    Current Outpatient Medications   Medication Sig Dispense Refill    acetaminophen (TylenoL) 325 mg tablet Take 1 tablet (325 mg) by mouth every 6 hours if needed. 1 TO 2 TABLETS      BIOFREEZE, MENTHOL, TOP Apply 1 Application topically 2 times a day as needed.      biotin 5 mg capsule Take 1 capsule (5 mg) by mouth once daily in the morning.      calcium carb/vitamin D3/vit K1 (VIACTIV ORAL) Take by mouth 2 times a day.      calcium polycarbophil (FIBER-CAPS, CA POLYCARBOPHIL, ORAL) Take 1 tablet by mouth once daily.      cholecalciferol (Vitamin D-3) 50 mcg (2,000 unit) capsule Take 1 capsule (50 mcg) by mouth early in the morning..      docusate sodium (Colace) 100 mg capsule Take 1 capsule (100 mg) by mouth once daily.      famotidine-Ca carb-mag hydrox (Pepcid Complete) -165 mg chewable tablet Chew 1 tablet once daily at bedtime.      fexofenadine (Allegra Allergy) 180 mg tablet Take 1 tablet (180 mg) by mouth once daily.      fluticasone (Flonase Allergy Relief) 50  mcg/actuation nasal spray Administer 2 sprays into affected nostril(s) once daily.      magnesium 200 mg tablet Take 1 tablet (200 mg) by mouth early in the morning.. Takes 250 mg daily      multivitamin capsule Take 1 capsule by mouth twice a day. Takes BALANCE OF NATURE      Myrbetriq 50 mg tablet extended release 24 hr 24 hr tablet Take 1 tablet (50 mg) by mouth once daily. 90 tablet 3    polyethylene glycol-electrolytes (Nulytely) 420 gram solution Drink 1/2 starting at 6 pm the night before your procedure then drink the 2nd 1/2 5 hours before procedure arrival time 4000 mL 0    potassium chloride CR 10 mEq ER tablet Take 1 tablet (10 mEq) by mouth 2 times a day. 180 tablet 3    verapamil SR (Calan-SR) 180 mg ER tablet TAKE ONE TABLET BY MOUTH TWO TIMES A  tablet 0     No current facility-administered medications for this visit.       Assessment/Plan     1.  Insulin resistance  2.  Hyperlipidemia  3.  Edema  4.  GERD  5.  Alopecia    Will check CBC TSH iron/TIBC today.    She did take biotin we discussed this potential impact and if her thyroid is abnormal would repeat it off of biotin.    We discussed the most common cause for hair loss being stress.    She will work to tighten up her diet.    She will get back to exercising.    Will follow-up with me in 3 months sooner as needed.

## 2025-02-10 DIAGNOSIS — E88.810 DYSMETABOLIC SYNDROME: ICD-10-CM

## 2025-02-10 RX ORDER — POTASSIUM CHLORIDE 750 MG/1
10 TABLET, EXTENDED RELEASE ORAL 2 TIMES DAILY
Qty: 180 TABLET | Refills: 0 | Status: SHIPPED | OUTPATIENT
Start: 2025-02-10

## 2025-02-25 DIAGNOSIS — Z12.11 COLON CANCER SCREENING: Primary | ICD-10-CM

## 2025-02-25 DIAGNOSIS — Z86.0100 HISTORY OF COLON POLYPS: ICD-10-CM

## 2025-02-25 RX ORDER — SODIUM, POTASSIUM,MAG SULFATES 17.5-3.13G
0.51 SOLUTION, RECONSTITUTED, ORAL ORAL SEE ADMIN INSTRUCTIONS
Qty: 354 ML | Refills: 0 | Status: SHIPPED | OUTPATIENT
Start: 2025-02-25

## 2025-02-25 NOTE — PROGRESS NOTES
I was asked by Dr. Jordan to intervene with patient's concerns.  We discussed PEG reaction in the past with Dr. David Yañez.  I added it to her allergy list.  Rx Suprep.  She prefers to have Dr Randy Ma perform her next colonoscopy.  I will message them separately.    Randy Hood, DO

## 2025-03-24 ENCOUNTER — APPOINTMENT (OUTPATIENT)
Dept: GASTROENTEROLOGY | Facility: EXTERNAL LOCATION | Age: 75
End: 2025-03-24
Payer: MEDICARE

## 2025-04-23 ENCOUNTER — TELEPHONE (OUTPATIENT)
Dept: PRIMARY CARE | Facility: CLINIC | Age: 75
End: 2025-04-23

## 2025-04-23 DIAGNOSIS — M72.2 PLANTAR FASCIITIS, RIGHT: Primary | ICD-10-CM

## 2025-05-22 ENCOUNTER — APPOINTMENT (OUTPATIENT)
Dept: ENDOCRINOLOGY | Facility: CLINIC | Age: 75
End: 2025-05-22
Payer: MEDICARE

## 2025-05-30 DIAGNOSIS — E88.810 DYSMETABOLIC SYNDROME: ICD-10-CM

## 2025-05-30 RX ORDER — POTASSIUM CHLORIDE 750 MG/1
10 TABLET, EXTENDED RELEASE ORAL 2 TIMES DAILY
Qty: 180 TABLET | Refills: 3 | Status: SHIPPED | OUTPATIENT
Start: 2025-05-30

## 2025-06-05 ENCOUNTER — APPOINTMENT (OUTPATIENT)
Dept: NEUROLOGY | Facility: CLINIC | Age: 75
End: 2025-06-05
Payer: MEDICARE

## 2025-06-20 ENCOUNTER — APPOINTMENT (OUTPATIENT)
Dept: GASTROENTEROLOGY | Facility: EXTERNAL LOCATION | Age: 75
End: 2025-06-20
Payer: MEDICARE

## 2025-06-25 ENCOUNTER — APPOINTMENT (OUTPATIENT)
Dept: GASTROENTEROLOGY | Facility: EXTERNAL LOCATION | Age: 75
End: 2025-06-25
Payer: MEDICARE

## 2025-06-25 DIAGNOSIS — Z86.0100 HX OF COLONIC POLYPS: ICD-10-CM

## 2025-06-25 DIAGNOSIS — Z12.11 SCREENING FOR COLON CANCER: Primary | ICD-10-CM

## 2025-06-25 DIAGNOSIS — D12.5 BENIGN NEOPLASM OF SIGMOID COLON: ICD-10-CM

## 2025-06-25 DIAGNOSIS — K57.32 DIVERTICULITIS OF LARGE INTESTINE WITHOUT PERFORATION OR ABSCESS WITHOUT BLEEDING: ICD-10-CM

## 2025-06-25 PROCEDURE — 88305 TISSUE EXAM BY PATHOLOGIST: CPT

## 2025-06-25 PROCEDURE — 45385 COLONOSCOPY W/LESION REMOVAL: CPT | Performed by: INTERNAL MEDICINE

## 2025-06-25 PROCEDURE — 88305 TISSUE EXAM BY PATHOLOGIST: CPT | Performed by: STUDENT IN AN ORGANIZED HEALTH CARE EDUCATION/TRAINING PROGRAM

## 2025-06-26 ENCOUNTER — LAB REQUISITION (OUTPATIENT)
Dept: LAB | Facility: HOSPITAL | Age: 75
End: 2025-06-26
Payer: MEDICARE

## 2025-06-26 DIAGNOSIS — Z12.11 ENCOUNTER FOR SCREENING FOR MALIGNANT NEOPLASM OF COLON: ICD-10-CM

## 2025-07-23 ENCOUNTER — APPOINTMENT (OUTPATIENT)
Dept: PRIMARY CARE | Facility: CLINIC | Age: 75
End: 2025-07-23
Payer: MEDICARE

## 2025-07-23 VITALS
BODY MASS INDEX: 44.65 KG/M2 | HEIGHT: 63 IN | SYSTOLIC BLOOD PRESSURE: 119 MMHG | DIASTOLIC BLOOD PRESSURE: 76 MMHG | OXYGEN SATURATION: 94 % | HEART RATE: 69 BPM | WEIGHT: 252 LBS

## 2025-07-23 DIAGNOSIS — Z00.00 HEALTH CARE MAINTENANCE: ICD-10-CM

## 2025-07-23 DIAGNOSIS — Z12.31 ENCOUNTER FOR SCREENING MAMMOGRAM FOR MALIGNANT NEOPLASM OF BREAST: ICD-10-CM

## 2025-07-23 DIAGNOSIS — Z78.0 MENOPAUSE: ICD-10-CM

## 2025-07-23 DIAGNOSIS — G43.909 MIGRAINE WITHOUT STATUS MIGRAINOSUS, NOT INTRACTABLE, UNSPECIFIED MIGRAINE TYPE: ICD-10-CM

## 2025-07-23 DIAGNOSIS — Z13.220 LIPID SCREENING: ICD-10-CM

## 2025-07-23 DIAGNOSIS — Z86.39 HISTORY OF HYPERLIPIDEMIA: Primary | ICD-10-CM

## 2025-07-23 PROCEDURE — 3008F BODY MASS INDEX DOCD: CPT | Performed by: SPECIALIST

## 2025-07-23 PROCEDURE — G2211 COMPLEX E/M VISIT ADD ON: HCPCS | Performed by: SPECIALIST

## 2025-07-23 PROCEDURE — 1160F RVW MEDS BY RX/DR IN RCRD: CPT | Performed by: SPECIALIST

## 2025-07-23 PROCEDURE — 99214 OFFICE O/P EST MOD 30 MIN: CPT | Performed by: SPECIALIST

## 2025-07-23 PROCEDURE — 1125F AMNT PAIN NOTED PAIN PRSNT: CPT | Performed by: SPECIALIST

## 2025-07-23 PROCEDURE — 1159F MED LIST DOCD IN RCRD: CPT | Performed by: SPECIALIST

## 2025-07-23 PROCEDURE — 1036F TOBACCO NON-USER: CPT | Performed by: SPECIALIST

## 2025-07-23 ASSESSMENT — ENCOUNTER SYMPTOMS
LOSS OF SENSATION IN FEET: 0
DEPRESSION: 0
OCCASIONAL FEELINGS OF UNSTEADINESS: 0

## 2025-07-23 ASSESSMENT — PAIN SCALES - GENERAL: PAINLEVEL_OUTOF10: 4

## 2025-07-23 NOTE — PROGRESS NOTES
Subjective   Patient ID: Meghan Rinaldi is a 74 y.o. female who presents for Follow-up (medication).  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 Ma    Brother in [place] is ill  Sister-in-Law     Gained some weight     Both eyes cataract surgery  IV access was an issue    Sees Will, gained some weight  Pool has been closed    Allergies[1]   Current Outpatient Medications   Medication Instructions    acetaminophen (TylenoL) 325 mg tablet 1 tablet, Every 6 hours PRN    BIOFREEZE, MENTHOL, TOP 1 Application, 2 times daily PRN    biotin 5 mg, Every morning    calcium carb/vitamin D3/vit K1 (VIACTIV ORAL) 2 times daily    calcium polycarbophil (FIBER-CAPS, CA POLYCARBOPHIL, ORAL) 1 tablet, Daily    cholecalciferol (VITAMIN D-3) 50 mcg, Daily    docusate sodium (COLACE) 100 mg, Daily    famotidine-Ca carb-mag hydrox (Pepcid Complete) -165 mg chewable tablet 1 tablet, Nightly    fexofenadine (Allegra Allergy) 180 mg tablet 1 tablet, Daily    fluticasone (Flonase Allergy Relief) 50 mcg/actuation nasal spray 2 sprays, Daily    magnesium 200 mg tablet 1 tablet, Daily    Myrbetriq 50 mg, oral, Daily    potassium chloride CR 10 mEq ER tablet 10 mEq, oral, 2 times daily    verapamil SR (CALAN-SR) 180 mg, oral, 2 times daily        Review of Systems  Constitutional  No fatigue, no fevers, no chills, no unintentional weight loss,   HEENT:  Occasional headaches, mild, no dizziness,  Cardiovascular:  No  chest pain, no palpitations, no shortness of breath with exertion (one flight of  "stairs)  Respiratory:  Still has cough since,   no hemoptysis, no wheezing, No shortness of breath at rest  GI:  No dysphagia, no odynophagia, no reflux, no abdominal pain, no nausea, no vomiting, no changes in bowel habits, no bright red blood per rectum, no melena  :  No urinary frequency, no dysuria, some times urine incontinence, wears pad   MSK:  No falls, Left knee stiffness joint pain,  Neuro:  No tremors, no extremity weakness, no changes in sensation  Breast:  No abnormalities on self breast exam no nipple discharge or skin changes    Physical Exam  /76 (BP Location: Left arm, Patient Position: Sitting, BP Cuff Size: Large adult)   Pulse 69   Ht 1.6 m (5' 3\")   Wt 114 kg (252 lb)   SpO2 94%   BMI 44.64 kg/m²   General:    Well-appearing  F in no acute distress, well nourished, well hydrated  Head:  Normocephalic, atraumatic  Skin:          Warm dry,   Eyes:  Anicteric sclera, pupils equal,   Oral:      Not examined due to pandemic  Neck:   Supple, no cervical/supraclavicular adenopathy  Cor:      Regular rate, normal S1, S2, no murmurs appreciated, no S3, no S4   Lungs:   Clear to auscultation b/l, no wheezes, no rhonchi, no crackles, no accessory respiratory muscle use   Ext:                   No axillary adenopathy appreciated     Assessment & Plan  Encounter for screening mammogram for malignant neoplasm of breast  Mammogram 9/10/2024  Orders:    BI mammo bilateral screening tomosynthesis; Future    Migraine without status migrainosus, not intractable, unspecified migraine type  Basilar Migraine Syndrome stable on verapamil    Orders:    CBC; Future    History of hyperlipidemia  Has been off of Crestor for some time  Orders:    CBC; Future    Comprehensive Metabolic Panel; Future    Menopause  DXA 7/27/2023 normal, ordered  Orders:    XR DEXA bone density; Future    Lipid screening  Off of Crestor for some time  Orders:    Lipid Panel; Future    Health care maintenance  Allergy influ " vac  Declines Covid vac  Declined RSV vaccine  Had zostavax declines shingrix vacs  Tdap 3/26/2025 after tick bite in [place] while visiting grandchildren (tolerated 1 time dose doxy)  DXA 7/27/2023 order was normal  Sees gyne in Sept,   Mammogram 9/10/2024 ordered  Colonoscopy 6/25/2025 repeat 5 yrs, plans with Dr. Ma  Labs ordered CMP CBC Fx Lipids              Maki Michel DO          [1]   Allergies  Allergen Reactions    Bee Venom Protein (Honey Bee) Anaphylaxis    Sulfa (Sulfonamide Antibiotics) Rash    Diclofenac Sodium Other     shingles    Doxycycline Other and Unknown     Shakes for a full weeks  Did tolerate one dose    Erythromycin Other     Respitory    Gabapentin Other     Tolerates brand    Other Other     Mercury (preservative) causes severe diarrhea USED in all GENERIC MEDS. TO HAVE NO GENERIC MEDS    Preservative Other     Contact solution, respiratory, diarrhea    Rosuvastatin Other     Couldn't get in brand name    Suprep Bowel Prep Kit [Sodium,Potassium,Mag Sulfates] Nausea/vomiting     vomiting    Thimerosal Other     Is allergic to mercury    Topiramate Other     States she becomes manic.    Amoxicillin Diarrhea and Rash    Cephalexin Rash and Other    Golytely [Peg 3350-Electrolytes] Rash and Nausea/vomiting     Had projectile vomiting

## 2025-07-23 NOTE — ASSESSMENT & PLAN NOTE
Has been off of Crestor for some time  Orders:    CBC; Future    Comprehensive Metabolic Panel; Future

## 2025-07-30 ENCOUNTER — HOSPITAL ENCOUNTER (OUTPATIENT)
Dept: RADIOLOGY | Facility: HOSPITAL | Age: 75
Discharge: HOME | End: 2025-07-30
Payer: MEDICARE

## 2025-07-30 DIAGNOSIS — Z78.0 MENOPAUSE: ICD-10-CM

## 2025-07-30 PROCEDURE — 77080 DXA BONE DENSITY AXIAL: CPT

## 2025-07-30 PROCEDURE — 77080 DXA BONE DENSITY AXIAL: CPT | Performed by: STUDENT IN AN ORGANIZED HEALTH CARE EDUCATION/TRAINING PROGRAM

## 2025-08-01 ENCOUNTER — APPOINTMENT (OUTPATIENT)
Dept: ENDOCRINOLOGY | Facility: CLINIC | Age: 75
End: 2025-08-01
Payer: MEDICARE

## 2025-08-01 VITALS — WEIGHT: 249 LBS | DIASTOLIC BLOOD PRESSURE: 70 MMHG | SYSTOLIC BLOOD PRESSURE: 126 MMHG | BODY MASS INDEX: 44.11 KG/M2

## 2025-08-01 DIAGNOSIS — R60.9 EDEMA, UNSPECIFIED TYPE: ICD-10-CM

## 2025-08-01 DIAGNOSIS — E88.810 DYSMETABOLIC SYNDROME: Primary | ICD-10-CM

## 2025-08-01 DIAGNOSIS — E78.5 HYPERLIPIDEMIA, UNSPECIFIED HYPERLIPIDEMIA TYPE: ICD-10-CM

## 2025-08-01 PROCEDURE — 99214 OFFICE O/P EST MOD 30 MIN: CPT | Performed by: INTERNAL MEDICINE

## 2025-08-01 NOTE — PROGRESS NOTES
Patient ID: Meghan Rinaldi is a 74 y.o. female who presents for Follow-up (Dysmetabolic syndrome follow up).  HPI  The patient comes in for follow up.    She has been on and off [facility] for management of insulin resistance with postprandial fatigue carbohydrate craving joint pain hyperlipidemia edema and GERD.    She has been on and off track.    She is back in the pool.    She is going to get back on track.    She has had a lot of stuff going on in her life.    Physically she has no other complaints.    ROS  Comprehensive review of systems is negative.    Objective   Physical Exam  Visit Vitals  /70      Vitals:    08/01/25 1501   Weight: 113 kg (249 lb)      Body mass index is 44.11 kg/m².      Weight 249 up 7 pounds still down 19    Eyes normal  ENT normal. No adenopathy  Thyroid palpable and normal. No nodules  Chest clear to auscultation  Heart sounds are normal  Abdomen nontender. Bowel sounds normal. No organomegaly  Feet are okay      Current Medications[1]    Assessment/Plan     1.  Insulin resistance  2.  Fatigue  3.  Hyperlipidemia    She is on a work to get back and stay on track.    We discussed the impact of exercise.    We will hold on blood work today.    We discussed more frequent visits for accountability.    She will follow-up with me in 3 months sooner as needed.           [1]   Current Outpatient Medications   Medication Sig Dispense Refill    acetaminophen (TylenoL) 325 mg tablet Take 1 tablet (325 mg) by mouth every 6 hours if needed. 1 TO 2 TABLETS      BIOFREEZE, MENTHOL, TOP Apply 1 Application topically 2 times a day as needed.      biotin 5 mg capsule Take 1 capsule (5 mg) by mouth once daily in the morning.      calcium carb/vitamin D3/vit K1 (VIACTIV ORAL) Take by mouth 2 times a day.      calcium polycarbophil (FIBER-CAPS, CA POLYCARBOPHIL, ORAL) Take 1 tablet by mouth once daily.      cholecalciferol (Vitamin D-3) 50 mcg (2,000 unit) capsule Take 1 capsule (50 mcg) by mouth early in  the morning..      docusate sodium (Colace) 100 mg capsule Take 1 capsule (100 mg) by mouth once daily.      famotidine-Ca carb-mag hydrox (Pepcid Complete) -165 mg chewable tablet Chew and swallow 1 tablet once daily at bedtime.      fexofenadine (Allegra Allergy) 180 mg tablet Take 1 tablet (180 mg) by mouth once daily.      fluticasone (Flonase Allergy Relief) 50 mcg/actuation nasal spray Administer 2 sprays into affected nostril(s) once daily.      magnesium 200 mg tablet Take 1 tablet (200 mg) by mouth early in the morning.. Takes 250 mg daily      Myrbetriq 50 mg tablet extended release 24 hr 24 hr tablet Take 1 tablet (50 mg) by mouth once daily. 90 tablet 3    potassium chloride CR 10 mEq ER tablet TAKE ONE TABLET BY MOUTH TWO TIMES A  tablet 3    verapamil SR (Calan-SR) 180 mg ER tablet Take 1 tablet (180 mg) by mouth 2 times a day. 180 tablet 3     No current facility-administered medications for this visit.

## 2025-08-04 DIAGNOSIS — N39.41 URGE INCONTINENCE: ICD-10-CM

## 2025-08-05 RX ORDER — MIRABEGRON 50 MG/1
50 TABLET, FILM COATED, EXTENDED RELEASE ORAL DAILY
Qty: 90 TABLET | Refills: 3 | Status: SHIPPED | OUTPATIENT
Start: 2025-08-05

## 2025-08-06 LAB
ALBUMIN SERPL-MCNC: 4.3 G/DL (ref 3.6–5.1)
ALP SERPL-CCNC: 99 U/L (ref 37–153)
ALT SERPL-CCNC: 16 U/L (ref 6–29)
ANION GAP SERPL CALCULATED.4IONS-SCNC: 9 MMOL/L (CALC) (ref 7–17)
AST SERPL-CCNC: 17 U/L (ref 10–35)
BILIRUB SERPL-MCNC: 0.4 MG/DL (ref 0.2–1.2)
BUN SERPL-MCNC: 16 MG/DL (ref 7–25)
CALCIUM SERPL-MCNC: 10.1 MG/DL (ref 8.6–10.4)
CHLORIDE SERPL-SCNC: 105 MMOL/L (ref 98–110)
CO2 SERPL-SCNC: 28 MMOL/L (ref 20–32)
CREAT SERPL-MCNC: 0.92 MG/DL (ref 0.6–1)
EGFRCR SERPLBLD CKD-EPI 2021: 65 ML/MIN/1.73M2
ERYTHROCYTE [DISTWIDTH] IN BLOOD BY AUTOMATED COUNT: 12.5 % (ref 11–15)
GLUCOSE SERPL-MCNC: 94 MG/DL (ref 65–99)
HCT VFR BLD AUTO: 45.5 % (ref 35–45)
HGB BLD-MCNC: 14.7 G/DL (ref 11.7–15.5)
MCH RBC QN AUTO: 32 PG (ref 27–33)
MCHC RBC AUTO-ENTMCNC: 32.3 G/DL (ref 32–36)
MCV RBC AUTO: 98.9 FL (ref 80–100)
PLATELET # BLD AUTO: 249 THOUSAND/UL (ref 140–400)
PMV BLD REES-ECKER: 10.9 FL (ref 7.5–12.5)
POTASSIUM SERPL-SCNC: 4.7 MMOL/L (ref 3.5–5.3)
PROT SERPL-MCNC: 6.6 G/DL (ref 6.1–8.1)
RBC # BLD AUTO: 4.6 MILLION/UL (ref 3.8–5.1)
SODIUM SERPL-SCNC: 142 MMOL/L (ref 135–146)
WBC # BLD AUTO: 7.2 THOUSAND/UL (ref 3.8–10.8)

## 2025-09-10 ENCOUNTER — APPOINTMENT (OUTPATIENT)
Dept: PRIMARY CARE | Facility: CLINIC | Age: 75
End: 2025-09-10
Payer: MEDICARE

## 2025-09-11 ENCOUNTER — APPOINTMENT (OUTPATIENT)
Dept: RADIOLOGY | Facility: HOSPITAL | Age: 75
End: 2025-09-11
Payer: MEDICARE

## 2025-09-17 ENCOUNTER — APPOINTMENT (OUTPATIENT)
Dept: RADIOLOGY | Facility: HOSPITAL | Age: 75
End: 2025-09-17
Payer: MEDICARE

## 2025-12-22 ENCOUNTER — APPOINTMENT (OUTPATIENT)
Dept: ENDOCRINOLOGY | Facility: CLINIC | Age: 75
End: 2025-12-22
Payer: MEDICARE